# Patient Record
Sex: FEMALE | Race: AMERICAN INDIAN OR ALASKA NATIVE | HISPANIC OR LATINO | Employment: FULL TIME | URBAN - METROPOLITAN AREA
[De-identification: names, ages, dates, MRNs, and addresses within clinical notes are randomized per-mention and may not be internally consistent; named-entity substitution may affect disease eponyms.]

---

## 2017-07-07 ENCOUNTER — HOSPITAL ENCOUNTER (EMERGENCY)
Facility: MEDICAL CENTER | Age: 32
End: 2017-07-07
Attending: EMERGENCY MEDICINE
Payer: COMMERCIAL

## 2017-07-07 ENCOUNTER — OFFICE VISIT (OUTPATIENT)
Dept: URGENT CARE | Facility: PHYSICIAN GROUP | Age: 32
End: 2017-07-07
Payer: COMMERCIAL

## 2017-07-07 ENCOUNTER — APPOINTMENT (OUTPATIENT)
Dept: RADIOLOGY | Facility: MEDICAL CENTER | Age: 32
End: 2017-07-07
Attending: EMERGENCY MEDICINE
Payer: COMMERCIAL

## 2017-07-07 VITALS
WEIGHT: 265 LBS | HEART RATE: 93 BPM | DIASTOLIC BLOOD PRESSURE: 68 MMHG | OXYGEN SATURATION: 97 % | BODY MASS INDEX: 44.1 KG/M2 | TEMPERATURE: 97.7 F | SYSTOLIC BLOOD PRESSURE: 138 MMHG | RESPIRATION RATE: 16 BRPM

## 2017-07-07 VITALS
WEIGHT: 265 LBS | OXYGEN SATURATION: 97 % | RESPIRATION RATE: 16 BRPM | DIASTOLIC BLOOD PRESSURE: 70 MMHG | BODY MASS INDEX: 44.15 KG/M2 | HEART RATE: 82 BPM | HEIGHT: 65 IN | SYSTOLIC BLOOD PRESSURE: 110 MMHG | TEMPERATURE: 99 F

## 2017-07-07 DIAGNOSIS — O20.0 ABORTION, THREATENED: ICD-10-CM

## 2017-07-07 DIAGNOSIS — O26.899 ABDOMINAL CRAMPING COMPLICATING PREGNANCY: ICD-10-CM

## 2017-07-07 DIAGNOSIS — F41.8 SITUATIONAL ANXIETY: ICD-10-CM

## 2017-07-07 DIAGNOSIS — R10.9 ABDOMINAL CRAMPING COMPLICATING PREGNANCY: ICD-10-CM

## 2017-07-07 DIAGNOSIS — Z34.90 PREGNANCY WITH UNCERTAIN DATES, UNSPECIFIED TRIMESTER: ICD-10-CM

## 2017-07-07 DIAGNOSIS — R82.71 ASYMPTOMATIC BACTERIURIA: ICD-10-CM

## 2017-07-07 DIAGNOSIS — R10.30 LOWER ABDOMINAL PAIN: ICD-10-CM

## 2017-07-07 LAB
ALBUMIN SERPL BCP-MCNC: 3.6 G/DL (ref 3.2–4.9)
ALBUMIN/GLOB SERPL: 1 G/DL
ALP SERPL-CCNC: 50 U/L (ref 30–99)
ALT SERPL-CCNC: 11 U/L (ref 2–50)
ANION GAP SERPL CALC-SCNC: 10 MMOL/L (ref 0–11.9)
APPEARANCE UR: CLEAR
AST SERPL-CCNC: 13 U/L (ref 12–45)
B-HCG SERPL-ACNC: ABNORMAL MIU/ML (ref 0–5)
BACTERIA #/AREA URNS HPF: ABNORMAL /HPF
BASOPHILS # BLD AUTO: 0.3 % (ref 0–1.8)
BASOPHILS # BLD: 0.03 K/UL (ref 0–0.12)
BILIRUB SERPL-MCNC: 0.2 MG/DL (ref 0.1–1.5)
BILIRUB UR QL STRIP.AUTO: NEGATIVE
BUN SERPL-MCNC: 8 MG/DL (ref 8–22)
CALCIUM SERPL-MCNC: 9 MG/DL (ref 8.5–10.5)
CAOX CRY #/AREA URNS HPF: ABNORMAL /HPF
CHLORIDE SERPL-SCNC: 102 MMOL/L (ref 96–112)
CO2 SERPL-SCNC: 21 MMOL/L (ref 20–33)
COLOR UR: YELLOW
CREAT SERPL-MCNC: 0.66 MG/DL (ref 0.5–1.4)
CULTURE IF INDICATED INDCX: YES UA CULTURE
EOSINOPHIL # BLD AUTO: 0.16 K/UL (ref 0–0.51)
EOSINOPHIL NFR BLD: 1.4 % (ref 0–6.9)
EPI CELLS #/AREA URNS HPF: ABNORMAL /HPF
ERYTHROCYTE [DISTWIDTH] IN BLOOD BY AUTOMATED COUNT: 41 FL (ref 35.9–50)
GFR SERPL CREATININE-BSD FRML MDRD: >60 ML/MIN/1.73 M 2
GLOBULIN SER CALC-MCNC: 3.7 G/DL (ref 1.9–3.5)
GLUCOSE SERPL-MCNC: 85 MG/DL (ref 65–99)
GLUCOSE UR STRIP.AUTO-MCNC: NEGATIVE MG/DL
HCT VFR BLD AUTO: 41.8 % (ref 37–47)
HGB BLD-MCNC: 14.1 G/DL (ref 12–16)
HYALINE CASTS #/AREA URNS LPF: ABNORMAL /LPF
IMM GRANULOCYTES # BLD AUTO: 0.04 K/UL (ref 0–0.11)
IMM GRANULOCYTES NFR BLD AUTO: 0.3 % (ref 0–0.9)
KETONES UR STRIP.AUTO-MCNC: NEGATIVE MG/DL
LEUKOCYTE ESTERASE UR QL STRIP.AUTO: NEGATIVE
LYMPHOCYTES # BLD AUTO: 2.58 K/UL (ref 1–4.8)
LYMPHOCYTES NFR BLD: 22.4 % (ref 22–41)
MCH RBC QN AUTO: 28.6 PG (ref 27–33)
MCHC RBC AUTO-ENTMCNC: 33.7 G/DL (ref 33.6–35)
MCV RBC AUTO: 84.8 FL (ref 81.4–97.8)
MICRO URNS: ABNORMAL
MONOCYTES # BLD AUTO: 0.67 K/UL (ref 0–0.85)
MONOCYTES NFR BLD AUTO: 5.8 % (ref 0–13.4)
NEUTROPHILS # BLD AUTO: 8.05 K/UL (ref 2–7.15)
NEUTROPHILS NFR BLD: 69.8 % (ref 44–72)
NITRITE UR QL STRIP.AUTO: NEGATIVE
NRBC # BLD AUTO: 0 K/UL
NRBC BLD AUTO-RTO: 0 /100 WBC
NUMBER OF RH DOSES IND 8505RD: NORMAL
PH UR STRIP.AUTO: 6 [PH]
PLATELET # BLD AUTO: 292 K/UL (ref 164–446)
PMV BLD AUTO: 9.8 FL (ref 9–12.9)
POTASSIUM SERPL-SCNC: 3.7 MMOL/L (ref 3.6–5.5)
PROT SERPL-MCNC: 7.3 G/DL (ref 6–8.2)
PROT UR QL STRIP: NEGATIVE MG/DL
RBC # BLD AUTO: 4.93 M/UL (ref 4.2–5.4)
RBC # URNS HPF: ABNORMAL /HPF
RBC UR QL AUTO: ABNORMAL
RH BLD: NORMAL
SODIUM SERPL-SCNC: 133 MMOL/L (ref 135–145)
SP GR UR STRIP.AUTO: 1.03
WBC # BLD AUTO: 11.5 K/UL (ref 4.8–10.8)
WBC #/AREA URNS HPF: ABNORMAL /HPF

## 2017-07-07 PROCEDURE — 85025 COMPLETE CBC W/AUTO DIFF WBC: CPT

## 2017-07-07 PROCEDURE — A9270 NON-COVERED ITEM OR SERVICE: HCPCS | Performed by: EMERGENCY MEDICINE

## 2017-07-07 PROCEDURE — 700102 HCHG RX REV CODE 250 W/ 637 OVERRIDE(OP): Performed by: EMERGENCY MEDICINE

## 2017-07-07 PROCEDURE — 99284 EMERGENCY DEPT VISIT MOD MDM: CPT

## 2017-07-07 PROCEDURE — 86901 BLOOD TYPING SEROLOGIC RH(D): CPT

## 2017-07-07 PROCEDURE — 80053 COMPREHEN METABOLIC PANEL: CPT

## 2017-07-07 PROCEDURE — 76817 TRANSVAGINAL US OBSTETRIC: CPT

## 2017-07-07 PROCEDURE — 81001 URINALYSIS AUTO W/SCOPE: CPT

## 2017-07-07 PROCEDURE — 84702 CHORIONIC GONADOTROPIN TEST: CPT

## 2017-07-07 PROCEDURE — 99202 OFFICE O/P NEW SF 15 MIN: CPT | Performed by: NURSE PRACTITIONER

## 2017-07-07 PROCEDURE — 87086 URINE CULTURE/COLONY COUNT: CPT

## 2017-07-07 RX ORDER — CEFDINIR 300 MG/1
300 CAPSULE ORAL 2 TIMES DAILY
Qty: 14 CAP | Refills: 0 | Status: SHIPPED | OUTPATIENT
Start: 2017-07-07 | End: 2017-07-14

## 2017-07-07 RX ORDER — CEFDINIR 300 MG/1
300 CAPSULE ORAL ONCE
Status: COMPLETED | OUTPATIENT
Start: 2017-07-07 | End: 2017-07-07

## 2017-07-07 RX ADMIN — CEFDINIR 300 MG: 300 CAPSULE ORAL at 21:23

## 2017-07-07 ASSESSMENT — ENCOUNTER SYMPTOMS
CHILLS: 0
CONSTIPATION: 0
ABDOMINAL PAIN: 1
HEADACHES: 0
VOMITING: 0
NAUSEA: 1
FEVER: 0
DIZZINESS: 0

## 2017-07-07 ASSESSMENT — LIFESTYLE VARIABLES: SUBSTANCE_ABUSE: 0

## 2017-07-07 ASSESSMENT — PAIN SCALES - GENERAL: PAINLEVEL: 5=MODERATE PAIN

## 2017-07-07 NOTE — MR AVS SNAPSHOT
"        Chey Guo   2017 3:40 PM   Office Visit   MRN: [id number]    Department:  [facility]   Dept Phone:  [phone]    Description:  Female : 1985   Provider:  SELMA Kurtz           Reason for Visit     LLQ Pain Lower L. cramping X 2 weeks Pt. is pregnant but is unsure how far along.       Allergies as of 2017     No Known Allergies      You were diagnosed with     Lower abdominal pain   [588903]       Pregnancy with uncertain dates, unspecified trimester   [5455926]         Vital Signs     Blood Pressure Pulse Temperature Respirations Height Weight    110/70 mmHg 82 37.2 °C (99 °F) 16 1.651 m (5' 5\") 120.203 kg (265 lb)    Body Mass Index Oxygen Saturation                44.10 kg/m2 97%          Basic Information     Date Of Birth Sex Race Ethnicity Preferred Language    1985 Female Unknown Unknown English      Your appointments     2017 10:30 AM   New OB Exam 1 with SONNY WAKEFIELD   [facility] [address] [address]   [phone]              Health Maintenance     Patient has no pending health maintenance at this time      Current Immunizations     No immunizations on file.      Below and/or attached are the medications your provider expects you to take. Review all of your home medications and newly ordered medications with your provider and/or pharmacist. Follow medication instructions as directed by your provider and/or pharmacist. Please keep your medication list with you and share with your provider. Update the information when medications are discontinued, doses are changed, or new medications (including over-the-counter products) are added; and carry medication information at all times in the event of emergency situations     Allergies:  No Known Allergies          Medications  Valid as of: 2017 -  4:39 PM    Generic Name Brand Name Tablet Size Instructions for use    Prenatal Multivit-Min-Fe-FA   " Take  by mouth.        .                 Medicines prescribed today were sent to:     [facility] [address] [place] [address], [place] [address] [facility] [place]    [address] [place] [address] [address] [place] [address]    Phone: [phone] Fax: [phone]    Open 24 Hours?: No      Medication refill instructions:       If your prescription bottle indicates you have medication refills left, it is not necessary to call your provider’s office. Please contact your pharmacy and they will refill your medication.    If your prescription bottle indicates you do not have any refills left, you may request refills at any time through one of the following ways: The online Hello Health system (except Urgent Care), by calling your provider’s office, or by asking your pharmacy to contact your provider’s office with a refill request. Medication refills are processed only during regular business hours and may not be available until the next business day. Your provider may request additional information or to have a follow-up visit with you prior to refilling your medication.   *Please Note: Medication refills are assigned a new Rx number when refilled electronically. Your pharmacy may indicate that no refills were authorized even though a new prescription for the same medication is available at the pharmacy. Please request the medicine by name with the pharmacy before contacting your provider for a refill.           Hello Health Access Code: [email or web address]  Expires: 8/6/2017  4:39 PM    Your email address is not on file at [facility].  Email Addresses are required for you to sign up for Hello Health, please contact [phone] to verify your personal information and to provide your email address prior to attempting to register for Hello Health.    Chey Guo  [address] [place] [address]    Hello Health  A secure, online tool to manage your health information     [facility]’s Hello Health® is a secure, online tool that connects you to your personalized  health information from the privacy of your home -- day or night - making it very easy for you to manage your healthcare. Once the activation process is completed, you can even access your medical information using the [employer] annmarie, which is available for free in the Apple Annmarie store or Google Play store.     To learn more about [employer] visit [email or web address]    There are two levels of access available (as shown below):   My Chart Features  [facility]  Specialists [facility]  Urgent  Care Non-[facility] Primary Care Doctor   Email your healthcare team securely and privately 24/7 X X X    Manage appointments: schedule your next appointment; view details of past/upcoming appointments X      Request prescription refills. X      View recent personal medical records, including lab and immunizations X X X X   View health record, including health history, allergies, medications X X X X   Read reports about your outpatient visits, procedures, consult and ER notes X X X X   See your discharge summary, which is a recap of your hospital and/or ER visit that includes your diagnosis, lab results, and care plan X X  X     How to register for [employer]  Once your e-mail address has been verified, follow the following steps to sign up for [employer]     1. Go to  [email or web address]  2. Click on the Sign Up Now box, which takes you to the New Member Sign Up page. You will need to provide the following information:  a. Enter your [employer] Access Code exactly as it appears at the top of this page. (You will not need to use this code after you’ve completed the sign-up process. If you do not sign up before the expiration date, you must request a new code.)   b. Enter your date of birth.   c. Enter your home email address.   d. Click Submit, and follow the next screen’s instructions.  3. Create a [employer] ID. This will be your [employer] login ID and cannot be changed, so think of one that is secure and easy to  remember.  4. Create a [employer] password. You can change your password at any time.  5. Enter your Password Reset Question and Answer. This can be used at a later time if you forget your password.   6. Enter your e-mail address. This allows you to receive e-mail notifications when new information is available in [employer].  7. Click Sign Up. You can now view your health information.    For assistance activating your [employer] account, call [phone]

## 2017-07-07 NOTE — ED AVS SNAPSHOT
Equals6 Access Code: [email or web address]  Expires: 8/6/2017  4:39 PM    Your email address is not on file at [facility].  Email Addresses are required for you to sign up for Equals6, please contact [phone] to verify your personal information and to provide your email address prior to attempting to register for Equals6.    Chey Guo  [address] [place] [address]    Equals6  A secure, online tool to manage your health information     [facility]’s Equals6® is a secure, online tool that connects you to your personalized health information from the privacy of your home -- day or night - making it very easy for you to manage your healthcare. Once the activation process is completed, you can even access your medical information using the [employer] annmarie, which is available for free in the Apple Annmarie store or Google Play store.     To learn more about Equals6, visit [email or web address]    There are two levels of access available (as shown below):   My Chart Features  [facility] Primary Care Doctor [facility]  Specialists [facility]  Urgent  Care Non-[facility] Primary Care Doctor   Email your healthcare team securely and privately 24/7 X X X    Manage appointments: schedule your next appointment; view details of past/upcoming appointments X      Request prescription refills. X      View recent personal medical records, including lab and immunizations X X X X   View health record, including health history, allergies, medications X X X X   Read reports about your outpatient visits, procedures, consult and ER notes X X X X   See your discharge summary, which is a recap of your hospital and/or ER visit that includes your diagnosis, lab results, and care plan X X  X     How to register for [employer]:  Once your e-mail address has been verified, follow the following steps to sign up for Equals6.     1. Go to  [email or web address]  2. Click on the Sign Up Now box, which takes you to the New Member Sign Up page. You will need to  provide the following information:  a. Enter your [employer] Access Code exactly as it appears at the top of this page. (You will not need to use this code after you’ve completed the sign-up process. If you do not sign up before the expiration date, you must request a new code.)   b. Enter your date of birth.   c. Enter your home email address.   d. Click Submit, and follow the next screen’s instructions.  3. Create a [employer] ID. This will be your [employer] login ID and cannot be changed, so think of one that is secure and easy to remember.  4. Create a [employer] password. You can change your password at any time.  5. Enter your Password Reset Question and Answer. This can be used at a later time if you forget your password.   6. Enter your e-mail address. This allows you to receive e-mail notifications when new information is available in Attorney[employer]ee.  7. Click Sign Up. You can now view your health information.    For assistance activating your [employer] account, call [phone]

## 2017-07-07 NOTE — ED AVS SNAPSHOT
Home Care Instructions                                                                                                                Chey Guo   MRN: [id number]    Department:  [facility], Emergency Dept   Date of Visit:  7/7/2017            [facility], Emergency Dept    [address] [address]    Phone:  [phone]      You were seen by     Rodger Camp M.D.      Your Diagnosis Was     Abdominal cramping complicating pregnancy     O26.899, R10.9       These are the medications you received during your hospitalization from 07/07/2017 1654 to 07/07/2017 2208     Date/Time Order Dose Route Action    07/07/2017 2123 cefdinir (OMNICEF) capsule 300 mg 300 mg Oral Given      Follow-up Information     1. Follow up with Pregnancy [facility] Rod. Schedule an appointment as soon as possible for a visit in 3 days.    Specialty:  OB/Gyn    Contact information    [address] [address]  [phone]        Medication Information     Review all of your home medications and newly ordered medications with your primary doctor and/or pharmacist as soon as possible. Follow medication instructions as directed by your doctor and/or pharmacist.     Please keep your complete medication list with you and share with your physician. Update the information when medications are discontinued, doses are changed, or new medications (including over-the-counter products) are added; and carry medication information at all times in the event of emergency situations.               Medication List      START taking these medications        Instructions    Morning Afternoon Evening Bedtime    cefdinir 300 MG Caps   Last time this was given:  300 mg on 7/7/2017  9:23 PM   Commonly known as:  OMNICEF        Take 1 Cap by mouth 2 times a day for 7 days.   Dose:  300 mg                          ASK your doctor about these medications        Instructions    Morning Afternoon Evening  Bedtime    PRENATAL VITAMINS PO        Take  by mouth.                             Where to Get Your Medications      You can get these medications from any pharmacy     Bring a paper prescription for each of these medications    - cefdinir 300 MG Caps            Procedures and tests performed during your visit     CBC WITH DIFFERENTIAL    COMP METABOLIC PANEL    ESTIMATED GFR    HCG QUANTITATIVE SERUM    IV SALINE LOCK    RH TYPE FOR RHOGAM FROM E.D.    Repeat Vitals Signs    URINALYSIS CULTURE, IF INDICATED    URINE CULTURE(NEW)    URINE MICROSCOPIC (W/UA)    US-OB PELVIS TRANSVAGINAL        Discharge Instructions       U had some bacteria in your urine, therefore you're being prescribed antibiotics.  Return to the ER here more abdominal pain, if you develop vaginal bleeding, fever, or other concerns.  Follow up with the pregnancy Center.      Abdominal Pain During Pregnancy  Belly (abdominal) pain is common during pregnancy. Most of the time, it is not a serious problem. Other times, it can be a sign that something is wrong with the pregnancy. Always tell your doctor if you have belly pain.  HOME CARE  Monitor your belly pain for any changes. The following actions may help you feel better:  · Do not have sex (intercourse) or put anything in your vagina until you feel better.  · Rest until your pain stops.  · Drink clear fluids if you feel sick to your stomach (nauseous). Do not eat solid food until you feel better.  · Only take medicine as told by your doctor.  · Keep all doctor visits as told.  GET HELP RIGHT AWAY IF:   · You are bleeding, leaking fluid, or pieces of tissue come out of your vagina.  · You have more pain or cramping.  · You keep throwing up (vomiting).  · You have pain when you pee (urinate) or have blood in your pee.  · You have a fever.  · You do not feel your baby moving as much.  · You feel very weak or feel like passing out.  · You have trouble breathing, with or without belly pain.  · You  have a very bad headache and belly pain.  · You have fluid leaking from your vagina and belly pain.  · You keep having watery poop (diarrhea).  · Your belly pain does not go away after resting, or the pain gets worse.  MAKE SURE YOU:   · Understand these instructions.  · Will watch your condition.  · Will get help right away if you are not doing well or get worse.     This information is not intended to replace advice given to you by your health care provider. Make sure you discuss any questions you have with your health care provider.     Document Released: 12/06/2010 Document Revised: 08/20/2014 Document Reviewed: 07/17/2014  [employer] Interactive Patient Education ©2016 Elsevier Inc.        Threatened Miscarriage  A threatened miscarriage occurs when you have vaginal bleeding during your first 20 weeks of pregnancy but the pregnancy has not ended. If you have vaginal bleeding during this time, your health care provider will do tests to make sure you are still pregnant. If the tests show you are still pregnant and the developing baby (fetus) inside your womb (uterus) is still growing, your condition is considered a threatened miscarriage.  A threatened miscarriage does not mean your pregnancy will end, but it does increase the risk of losing your pregnancy (complete miscarriage).  CAUSES   The cause of a threatened miscarriage is usually not known. If you go on to have a complete miscarriage, the most common cause is an abnormal number of chromosomes in the developing baby. Chromosomes are the structures inside cells that hold all your genetic material.  Some causes of vaginal bleeding that do not result in miscarriage include:  · Having sex.  · Having an infection.  · Normal hormone changes of pregnancy.  · Bleeding that occurs when an egg implants in your uterus.  RISK FACTORS  Risk factors for bleeding in early pregnancy include:  · Obesity.  · Smoking.  · Drinking excessive amounts of alcohol or  caffeine.  · Recreational drug use.  SIGNS AND SYMPTOMS  · Light vaginal bleeding.  · Mild abdominal pain or cramps.  DIAGNOSIS   If you have bleeding with or without abdominal pain before 20 weeks of pregnancy, your health care provider will do tests to check whether you are still pregnant. One important test involves using sound waves and a computer (ultrasound) to create images of the inside of your uterus. Other tests include an internal exam of your vagina and uterus (pelvic exam) and measurement of your baby's heart rate.   You may be diagnosed with a threatened miscarriage if:  · Ultrasound testing shows you are still pregnant.  · Your baby's heart rate is strong.  · A pelvic exam shows that the opening between your uterus and your vagina (cervix) is closed.  · Your heart rate and blood pressure are stable.  · Blood tests confirm you are still pregnant.  TREATMENT   No treatments have been shown to prevent a threatened miscarriage from going on to a complete miscarriage. However, the right home care is important.   HOME CARE INSTRUCTIONS   · Make sure you keep all your appointments for prenatal care. This is very important.  · Get plenty of rest.  · Do not have sex or use tampons if you have vaginal bleeding.  · Do not douche.  · Do not smoke or use recreational drugs.  · Do not drink alcohol.  · Avoid caffeine.  SEEK MEDICAL CARE IF:  · You have light vaginal bleeding or spotting while pregnant.  · You have abdominal pain or cramping.  · You have a fever.  SEEK IMMEDIATE MEDICAL CARE IF:  · You have heavy vaginal bleeding.  · You have blood clots coming from your vagina.  · You have severe low back pain or abdominal cramps.  · You have fever, chills, and severe abdominal pain.  MAKE SURE YOU:  · Understand these instructions.  · Will watch your condition.  · Will get help right away if you are not doing well or get worse.     This information is not intended to replace advice given to you by your health care  provider. Make sure you discuss any questions you have with your health care provider.     Document Released: 12/18/2006 Document Revised: 12/23/2014 Document Reviewed: 10/14/2014  [employer] Interactive Patient Education ©2016 [employer].      Urinary Tract Infection  Urinary tract infections (UTIs) can develop anywhere along your urinary tract. Your urinary tract is your body's drainage system for removing wastes and extra water. Your urinary tract includes two kidneys, two ureters, a bladder, and a urethra. Your kidneys are a pair of bean-shaped organs. Each kidney is about the size of your fist. They are located below your ribs, one on each side of your spine.  CAUSES  Infections are caused by microbes, which are microscopic organisms, including fungi, viruses, and bacteria. These organisms are so small that they can only be seen through a microscope. Bacteria are the microbes that most commonly cause UTIs.  SYMPTOMS   Symptoms of UTIs may vary by age and gender of the patient and by the location of the infection. Symptoms in young women typically include a frequent and intense urge to urinate and a painful, burning feeling in the bladder or urethra during urination. Older women and men are more likely to be tired, shaky, and weak and have muscle aches and abdominal pain. A fever may mean the infection is in your kidneys. Other symptoms of a kidney infection include pain in your back or sides below the ribs, nausea, and vomiting.  DIAGNOSIS  To diagnose a UTI, your caregiver will ask you about your symptoms. Your caregiver also will ask to provide a urine sample. The urine sample will be tested for bacteria and white blood cells. White blood cells are made by your body to help fight infection.  TREATMENT   Typically, UTIs can be treated with medication. Because most UTIs are caused by a bacterial infection, they usually can be treated with the use of antibiotics. The choice of antibiotic and length of treatment  depend on your symptoms and the type of bacteria causing your infection.  HOME CARE INSTRUCTIONS  · If you were prescribed antibiotics, take them exactly as your caregiver instructs you. Finish the medication even if you feel better after you have only taken some of the medication.  · Drink enough water and fluids to keep your urine clear or pale yellow.  · Avoid caffeine, tea, and carbonated beverages. They tend to irritate your bladder.  · Empty your bladder often. Avoid holding urine for long periods of time.  · Empty your bladder before and after sexual intercourse.  · After a bowel movement, women should cleanse from front to back. Use each tissue only once.  SEEK MEDICAL CARE IF:   · You have back pain.  · You develop a fever.  · Your symptoms do not begin to resolve within 3 days.  SEEK IMMEDIATE MEDICAL CARE IF:   · You have severe back pain or lower abdominal pain.  · You develop chills.  · You have nausea or vomiting.  · You have continued burning or discomfort with urination.  MAKE SURE YOU:   · Understand these instructions.  · Will watch your condition.  · Will get help right away if you are not doing well or get worse.     This information is not intended to replace advice given to you by your health care provider. Make sure you discuss any questions you have with your health care provider.     Document Released: 09/27/2006 Document Revised: 01/08/2016 Document Reviewed: 01/25/2013  Elsevier Interactive Patient Education ©2016 [employer]            Patient Information     Patient Information    Following emergency treatment: all patient requiring follow-up care must return either to a private physician or a clinic if your condition worsens before you are able to obtain further medical attention, please return to the emergency room.     Billing Information    At [facility], we work to make the billing process streamlined for our patients.  Our Representatives are here to answer any questions you  may have regarding your hospital bill.  If you have insurance coverage and have supplied your insurance information to us, we will submit a claim to your insurer on your behalf.  Should you have any questions regarding your bill, we can be reached online or by phone as follows:  Online: You are able pay your bills online or live chat with our representatives about any billing questions you may have. We are here to help Monday - Friday from 8:00am to 7:30pm and 9:00am - 12:00pm on Saturdays.  Please visit [email or web address]  for more information.   Phone:  [phone] or [phone]    Please note that your emergency physician, surgeon, pathologist, radiologist, anesthesiologist, and other specialists are not employed by [facility] and will therefore bill separately for their services.  Please contact them directly for any questions concerning their bills at the numbers below:     Emergency Physician Services:  [phone]  [facility]:  [phone]  [facility]:  [phone]  [facility]:  [phone]    1. Your final bill may vary from the amount quoted upon discharge if all procedures are not complete at that time, or if your doctor has additional procedures of which we are not aware. You will receive an additional bill if you return to the Emergency Department at [facility] for suture removal regardless of the facility of which the sutures were placed.     2. Please arrange for settlement of this account at the emergency registration.    3. All self-pay accounts are due in full at the time of treatment.  If you are unable to meet this obligation then payment is expected within 4-5 days.     4. If you have had radiology studies (CT, X-ray, Ultrasound, MRI), you have received a preliminary result during your emergency department visit. Please contact the radiology department [phone] to receive a copy of your final  result. Please discuss the Final result with your primary physician or with the follow up physician provided.     Crisis Hotline:  [place] Crisis Hotline:  6-724-LGTKYOU or 1-774.156.3038  [place] Crisis Hotline:    1-483.895.8374 or 574-045-2216         ED Discharge Follow Up Questions    1. In order to provide you with very good care, we would like to follow up with a phone call in the next few days.  May we have your permission to contact you?     YES /  NO    2. What is the best phone number to call you? (       )_____-__________    3. What is the best time to call you?      Morning  /  Afternoon  /  Evening                   Patient Signature:  ____________________________________________________________    Date:  ____________________________________________________________      Your appointments     Jul 14, 2017 10:30 AM   New OB Exam 1 with SONNY WAKEFIELD   [facility])    [address] [address]  [place] [address]   [phone]

## 2017-07-07 NOTE — ED AVS SNAPSHOT
7/7/2017    Chey Guo  [address] [address]    Dear Chey:    [facility] wants to ensure your discharge home is safe and you or your loved ones have had all of your questions answered regarding your care after you leave the hospital.    Below is a list of resources and contact information should you have any questions regarding your hospital stay, follow-up instructions, or active medical symptoms.    Questions or Concerns Regarding… Contact   Medical Questions Related to Your Discharge  (7 days a week, 8am-5pm) Contact a Nurse Care Coordinator   [phone]   Medical Questions Not Related to Your Discharge  (24 hours a day / 7 days a week)  Contact the Nurse Health Line   [phone]    Medications or Discharge Instructions Refer to your discharge packet   or contact your [facility] Primary Care Provider   [phone]   Follow-up Appointment(s) Schedule your appointment via [employer]   or contact Scheduling [phone]   Billing Review your statement via [employer]  or contact Billing [phone]   Medical Records Review your records via [employer]   or contact Medical Records [phone]     You may receive a telephone call within two days of discharge. This call is to make certain you understand your discharge instructions and have the opportunity to have any questions answered. You can also easily access your medical information, test results and upcoming appointments via the [employer] free online health management tool. You can learn more and sign up at [employer]. For assistance setting up your [employer] account, please call [phone].    Once again, we want to ensure your discharge home is safe and that you have a clear understanding of any next steps in your care. If you have any questions or concerns, please do not hesitate to contact us, we are here for you. Thank you for choosing [facility] for your healthcare needs.    Sincerely,    Your [facility] Healthcare Team

## 2017-07-07 NOTE — PROGRESS NOTES
"Subjective:      Chey Guo is a 32 y.o. female who presents with LLQ Pain    PFSH reviewed and updated as necessary in EPIC electronic record with patient today.  Medications including OTC medications reviewed with patient.         No Known Allergies          HPI abdominal cramping started a few weeks ago, increasing over the past 3 days.  + pregnancy. G2, P1. LMP: Unknown ( irregular menses) Hx of having now menses for > 6 months. . Has appt with [facility] pregnancy center for US scheduled next week. Has waited over a month for her US appt.   Similar pain to menstrual cramping in lower abdomen and in lower abdomen.   No vaginal bleeding. No dysuria, burning. She is having urinary frequency. She was trying to get pregnant for the past 5 years with [facility].     Review of Systems   Constitutional: Negative for fever and chills.   Cardiovascular: Negative for leg swelling.   Gastrointestinal: Negative for constipation.   Genitourinary: Negative for hematuria.   Neurological: Negative for dizziness and headaches.   Psychiatric/Behavioral: Negative for substance abuse.          Objective:     /70 mmHg  Pulse 82  Temp(Src) 37.2 °C (99 °F)  Resp 16  Ht 1.651 m (5' 5\")  Wt 120.203 kg (265 lb)  BMI 44.10 kg/m2  SpO2 97%     Physical Exam   Constitutional: She is oriented to person, place, and time. She appears well-developed and well-nourished.   HENT:   Head: Normocephalic.   Cardiovascular: Normal rate.    Pulmonary/Chest: Effort normal.   Abdominal: Soft. Normal appearance and bowel sounds are normal. She exhibits no distension. There is generalized tenderness and tenderness in the right lower quadrant and left lower quadrant. There is no rigidity, no rebound, no guarding and no CVA tenderness.   Unable to feel fundus height secondary to body habitus     Neurological: She is alert and oriented to person, place, and time.   Nursing note and vitals reviewed.       Pt voided in waiting room and unable to give " Urine specimen when roomed. Given water to drink. Sample not obtained since it was decided to send her to ER .        Assessment/Plan:     1. Lower abdominal pain     2. Pregnancy with uncertain dates, unspecified trimester       [facility] ER  called to arrange transfer to higher level of care. Report given to Dr Guo.  Pt is to be transported via private car.   I have reiterated to patient that although a provider to provider transfer was made this will not necessarily expedite the ER process.

## 2017-07-08 NOTE — ED NOTES
"Pt states that she took a urine pregnancy test at home \"a couple weeks ago and it was positive so I went to the [facility] and they did a blood test and when I called back to get the results they told me I had to wait until I went to the pregnancy center but that's not until next week\".  "

## 2017-07-08 NOTE — ED NOTES
Pt talking on cell phone during triage. Pt c/o abd cramping, pt states pregnant and unsure how far. Denies vaginal bleeding. Pt has appt at pregnancy center next week.

## 2017-07-08 NOTE — ED PROVIDER NOTES
ED Provider Note    Scribed for Rodger Camp M.D. by Samuel Gonzalez. 2017, 6:01 PM.    Primary care provider: Pcp Pt States None  Means of arrival: Walk in  History obtained from: Patient  History limited by: None    CHIEF COMPLAINT  Chief Complaint   Patient presents with   • Abdominal Cramping       HPI  Chey Guo is a 32 y.o. female who presents to the Emergency Department complaining of intermittent abdominal cramping onset a couple of weeks ago. Patient localizes the pain primarily to her bilateral lower quadrants of her abdomen. The cramps come in waves. Patient states the cramping feels similar to menstrual cramps. She rates the pain a 2-3/10 in severity at this time. Patient notes associated nausea. She denies any vaginal discharge, dysuria, or vomiting. She is unsure when her last menstrual period was. She notes currently being pregnant which she verified with a home pregnancy kit and after evaluation in her doctor's office. Patient has been pregnant before and currently has one child. Patient denies chance of having STD. She notes taking medication for her thyroid. Patient is a former smoker. She stopped smoking after finding out she was pregnant. She denies alcohol consumption. Patient had a  in the past. She otherwise denies any other surgeries.  she G2, P1, unknown LMP    REVIEW OF SYSTEMS  Review of Systems   Gastrointestinal: Positive for nausea and abdominal pain (cramping). Negative for vomiting.   Genitourinary: Negative for dysuria.        Negative vaginal discharge   All other systems reviewed and are negative.  C    PAST MEDICAL HISTORY   None noted    SURGICAL HISTORY  patient denies any surgical history    SOCIAL HISTORY  None noted    FAMILY HISTORY  None noted    CURRENT MEDICATIONS  Current medications can be seen on the nurse's note.     ALLERGIES  No Known Allergies    PHYSICAL EXAM  VITAL SIGNS: /71 mmHg  Pulse 96  Temp(Src) 36.5 °C (97.7 °F) (Temporal)   Resp 18  Wt 120.203 kg (265 lb)  SpO2 97%  Vitals reviewed.  Constitutional: Well developed, Well nourished, No acute distress, Non-toxic appearance.   HENT: Normocephalic, Atraumatic, Bilateral external ears normal, Oropharynx moist, No oral exudates, Nose normal.   Eyes: PERRL, EOMI, Conjunctiva normal, No discharge.   Neck: Normal range of motion, No tenderness, Supple, No stridor.   Cardiovascular: Normal heart rate, Normal rhythm, No murmurs, No rubs, No gallops.   Thorax & Lungs: Normal breath sounds, No respiratory distress, No wheezing, No chest tenderness.   Abdomen: Bowel sounds normal, Soft. Left lower quadrant tenderness.  No right lower quadrant tenderness.  No peritonitis.  Skin: Warm, Dry, No erythema, No rash.   Back: No tenderness, No CVA tenderness.   Musculoskeletal: Good range of motion in all major joints.   Neurologic: Alert, No focal deficits noted.   Psychiatric: Affect anxious    LABS  Results for orders placed or performed during the hospital encounter of 07/07/17   CBC WITH DIFFERENTIAL   Result Value Ref Range    WBC 11.5 (H) 4.8 - 10.8 K/uL    RBC 4.93 4.20 - 5.40 M/uL    Hemoglobin 14.1 12.0 - 16.0 g/dL    Hematocrit 41.8 37.0 - 47.0 %    MCV 84.8 81.4 - 97.8 fL    MCH 28.6 27.0 - 33.0 pg    MCHC 33.7 33.6 - 35.0 g/dL    RDW 41.0 35.9 - 50.0 fL    Platelet Count 292 164 - 446 K/uL    MPV 9.8 9.0 - 12.9 fL    Neutrophils-Polys 69.80 44.00 - 72.00 %    Lymphocytes 22.40 22.00 - 41.00 %    Monocytes 5.80 0.00 - 13.40 %    Eosinophils 1.40 0.00 - 6.90 %    Basophils 0.30 0.00 - 1.80 %    Immature Granulocytes 0.30 0.00 - 0.90 %    Nucleated RBC 0.00 /100 WBC    Neutrophils (Absolute) 8.05 (H) 2.00 - 7.15 K/uL    Lymphs (Absolute) 2.58 1.00 - 4.80 K/uL    Monos (Absolute) 0.67 0.00 - 0.85 K/uL    Eos (Absolute) 0.16 0.00 - 0.51 K/uL    Baso (Absolute) 0.03 0.00 - 0.12 K/uL    Immature Granulocytes (abs) 0.04 0.00 - 0.11 K/uL    NRBC (Absolute) 0.00 K/uL   COMP METABOLIC PANEL   Result  Value Ref Range    Sodium 133 (L) 135 - 145 mmol/L    Potassium 3.7 3.6 - 5.5 mmol/L    Chloride 102 96 - 112 mmol/L    Co2 21 20 - 33 mmol/L    Anion Gap 10.0 0.0 - 11.9    Glucose 85 65 - 99 mg/dL    Bun 8 8 - 22 mg/dL    Creatinine 0.66 0.50 - 1.40 mg/dL    Calcium 9.0 8.5 - 10.5 mg/dL    AST(SGOT) 13 12 - 45 U/L    ALT(SGPT) 11 2 - 50 U/L    Alkaline Phosphatase 50 30 - 99 U/L    Total Bilirubin 0.2 0.1 - 1.5 mg/dL    Albumin 3.6 3.2 - 4.9 g/dL    Total Protein 7.3 6.0 - 8.2 g/dL    Globulin 3.7 (H) 1.9 - 3.5 g/dL    A-G Ratio 1.0 g/dL   HCG QUANTITATIVE SERUM   Result Value Ref Range    Bhcg 94387.0 (H) 0.0 - 5.0 mIU/mL   URINALYSIS CULTURE, IF INDICATED   Result Value Ref Range    Color Yellow     Character Clear     Specific Gravity 1.031 <1.035    Ph 6.0 5.0-8.0    Glucose Negative Negative mg/dL    Ketones Negative Negative mg/dL    Protein Negative Negative mg/dL    Bilirubin Negative Negative    Nitrite Negative Negative    Leukocyte Esterase Negative Negative    Occult Blood Trace (A) Negative    Micro Urine Req Microscopic     Culture Indicated Yes UA Culture   RH TYPE FOR RHOGAM FROM E.D.   Result Value Ref Range    Emergency Department Rh Typing POS     Number Of Rh Doses Indicated ZERO    URINE MICROSCOPIC (W/UA)   Result Value Ref Range    WBC 5-10 (A) /hpf    RBC 0-2 /hpf    Bacteria Many (A) None /hpf    Epithelial Cells Moderate (A) /hpf    Ca Oxalate Crystal Few /hpf    Hyaline Cast 6-10 (A) /lpf   ESTIMATED GFR   Result Value Ref Range    GFR If African American >60 >60 mL/min/1.73 m 2    GFR If Non African American >60 >60 mL/min/1.73 m 2      All labs reviewed by me.    RADIOLOGY  US-OB PELVIS TRANSVAGINAL   Final Result      1.  11 week 6 day intrauterine pregnancy      2.  5.4 cm right adnexal cyst        The radiologist's interpretation of all radiological studies have been reviewed by me.    COURSE & MEDICAL DECISION MAKING  Pertinent Labs & Imaging studies reviewed. (See chart for  details)        6:01 PM Patient seen and examined at bedside. The patient presents with abdominal cramping, and the differential diagnosis includes but is not limited to , threatened miscarriage, early pregnancy, ovarian cyst, ovarian torsion, UTI, or GI etiology.. Ordered for US-OB pelvis transvaginal, HCG quantitative serum, urinalysis culture, RH type for rhogam, CBC, CMP  to evaluate. Ordered Omnicef 300 mg.    8:44 PM Patient rechecked at bedside. The patient was updated on diagnostic test results as seen above. The patient will be discharged, status improved, with instructions regarding supportive care and medications. Instructions were given for appropriate follow-up. Discussed indications for seeking immediate medical attention. Patient was given the opportunity for questions. The patient understands and agrees.      Patient has 11 week 6 day IUP, which correlates to her Quant.  She has some bacteria and white cells in urine, which we will treat with Omnicef.  She doesn't have any bleeding.  She denies any STD risk factors.  I don't think this is an STD.  I don't think pelvic examination for this time.  She has no mucousy or abnormal discharge.  She reports.  She does not have signs of a kidney stone.  She doesn't have much in the way of vomiting or diarrhea.  I don't think this is just intestinal problem.  She does have an ovarian cyst on the right and left.  At this point, I think this is cramping associate with early pregnancy.  I will treat her for asymptomatic bacteriuria.  We'll give return precautions for abdominal pain, pregnancy, threatened miscarriage and UTI.  Follow up with the pregnancy Center.  She should return to the ER for pain, fever, vomiting or other concerns.  She is agreeable to plan.  Questions are answered.  She is discharged in good condition.     The patient will return for new or worsening symptoms and is stable at the time of discharge.    The patient is referred to a primary  physician for blood pressure management, diabetic screening, and for all other preventative health concerns.    DISPOSITION:  Patient will be discharged home in stable condition.    FOLLOW UP:  [facility], M[facility]  [address] [place] [address]  [place] [address]  [phone]    Schedule an appointment as soon as possible for a visit in 3 days        OUTPATIENT MEDICATIONS:  New Prescriptions    CEFDINIR (OMNICEF) 300 MG CAP    Take 1 Cap by mouth 2 times a day for 7 days.          FINAL IMPRESSION  1. Abdominal cramping complicating pregnancy    2. , threatened    3. Asymptomatic bacteriuria    4. Situational anxiety          Samuel JACKSON (Scribe), am scribing for, and in the presence of, Rodger Camp M.D..    Electronically signed by: Samuel Gonzalez (Scribe), 2017    Rodger JACKSON M.D. personally performed the services described in this documentation, as scribed by Samuel Gonzalez in my presence, and it is both accurate and complete.    The note accurately reflects work and decisions made by me.  Rodger Camp  2017  9:54 PM

## 2017-07-08 NOTE — DISCHARGE INSTRUCTIONS
U had some bacteria in your urine, therefore you're being prescribed antibiotics.  Return to the ER here more abdominal pain, if you develop vaginal bleeding, fever, or other concerns.  Follow up with the pregnancy Center.      Abdominal Pain During Pregnancy  Belly (abdominal) pain is common during pregnancy. Most of the time, it is not a serious problem. Other times, it can be a sign that something is wrong with the pregnancy. Always tell your doctor if you have belly pain.  HOME CARE  Monitor your belly pain for any changes. The following actions may help you feel better:  · Do not have sex (intercourse) or put anything in your vagina until you feel better.  · Rest until your pain stops.  · Drink clear fluids if you feel sick to your stomach (nauseous). Do not eat solid food until you feel better.  · Only take medicine as told by your doctor.  · Keep all doctor visits as told.  GET HELP RIGHT AWAY IF:   · You are bleeding, leaking fluid, or pieces of tissue come out of your vagina.  · You have more pain or cramping.  · You keep throwing up (vomiting).  · You have pain when you pee (urinate) or have blood in your pee.  · You have a fever.  · You do not feel your baby moving as much.  · You feel very weak or feel like passing out.  · You have trouble breathing, with or without belly pain.  · You have a very bad headache and belly pain.  · You have fluid leaking from your vagina and belly pain.  · You keep having watery poop (diarrhea).  · Your belly pain does not go away after resting, or the pain gets worse.  MAKE SURE YOU:   · Understand these instructions.  · Will watch your condition.  · Will get help right away if you are not doing well or get worse.     This information is not intended to replace advice given to you by your health care provider. Make sure you discuss any questions you have with your health care provider.     Document Released: 12/06/2010 Document Revised: 08/20/2014 Document Reviewed:  07/17/2014  [employer] Interactive Patient Education ©2016 [employer]        Threatened Miscarriage  A threatened miscarriage occurs when you have vaginal bleeding during your first 20 weeks of pregnancy but the pregnancy has not ended. If you have vaginal bleeding during this time, your health care provider will do tests to make sure you are still pregnant. If the tests show you are still pregnant and the developing baby (fetus) inside your womb (uterus) is still growing, your condition is considered a threatened miscarriage.  A threatened miscarriage does not mean your pregnancy will end, but it does increase the risk of losing your pregnancy (complete miscarriage).  CAUSES   The cause of a threatened miscarriage is usually not known. If you go on to have a complete miscarriage, the most common cause is an abnormal number of chromosomes in the developing baby. Chromosomes are the structures inside cells that hold all your genetic material.  Some causes of vaginal bleeding that do not result in miscarriage include:  · Having sex.  · Having an infection.  · Normal hormone changes of pregnancy.  · Bleeding that occurs when an egg implants in your uterus.  RISK FACTORS  Risk factors for bleeding in early pregnancy include:  · Obesity.  · Smoking.  · Drinking excessive amounts of alcohol or caffeine.  · Recreational drug use.  SIGNS AND SYMPTOMS  · Light vaginal bleeding.  · Mild abdominal pain or cramps.  DIAGNOSIS   If you have bleeding with or without abdominal pain before 20 weeks of pregnancy, your health care provider will do tests to check whether you are still pregnant. One important test involves using sound waves and a computer (ultrasound) to create images of the inside of your uterus. Other tests include an internal exam of your vagina and uterus (pelvic exam) and measurement of your baby's heart rate.   You may be diagnosed with a threatened miscarriage if:  · Ultrasound testing shows you are still  pregnant.  · Your baby's heart rate is strong.  · A pelvic exam shows that the opening between your uterus and your vagina (cervix) is closed.  · Your heart rate and blood pressure are stable.  · Blood tests confirm you are still pregnant.  TREATMENT   No treatments have been shown to prevent a threatened miscarriage from going on to a complete miscarriage. However, the right home care is important.   HOME CARE INSTRUCTIONS   · Make sure you keep all your appointments for prenatal care. This is very important.  · Get plenty of rest.  · Do not have sex or use tampons if you have vaginal bleeding.  · Do not douche.  · Do not smoke or use recreational drugs.  · Do not drink alcohol.  · Avoid caffeine.  SEEK MEDICAL CARE IF:  · You have light vaginal bleeding or spotting while pregnant.  · You have abdominal pain or cramping.  · You have a fever.  SEEK IMMEDIATE MEDICAL CARE IF:  · You have heavy vaginal bleeding.  · You have blood clots coming from your vagina.  · You have severe low back pain or abdominal cramps.  · You have fever, chills, and severe abdominal pain.  MAKE SURE YOU:  · Understand these instructions.  · Will watch your condition.  · Will get help right away if you are not doing well or get worse.     This information is not intended to replace advice given to you by your health care provider. Make sure you discuss any questions you have with your health care provider.     Document Released: 12/18/2006 Document Revised: 12/23/2014 Document Reviewed: 10/14/2014  [employer] Interactive Patient Education ©2016 [employer]      Urinary Tract Infection  Urinary tract infections (UTIs) can develop anywhere along your urinary tract. Your urinary tract is your body's drainage system for removing wastes and extra water. Your urinary tract includes two kidneys, two ureters, a bladder, and a urethra. Your kidneys are a pair of bean-shaped organs. Each kidney is about the size of your fist. They are located below  your ribs, one on each side of your spine.  CAUSES  Infections are caused by microbes, which are microscopic organisms, including fungi, viruses, and bacteria. These organisms are so small that they can only be seen through a microscope. Bacteria are the microbes that most commonly cause UTIs.  SYMPTOMS   Symptoms of UTIs may vary by age and gender of the patient and by the location of the infection. Symptoms in young women typically include a frequent and intense urge to urinate and a painful, burning feeling in the bladder or urethra during urination. Older women and men are more likely to be tired, shaky, and weak and have muscle aches and abdominal pain. A fever may mean the infection is in your kidneys. Other symptoms of a kidney infection include pain in your back or sides below the ribs, nausea, and vomiting.  DIAGNOSIS  To diagnose a UTI, your caregiver will ask you about your symptoms. Your caregiver also will ask to provide a urine sample. The urine sample will be tested for bacteria and white blood cells. White blood cells are made by your body to help fight infection.  TREATMENT   Typically, UTIs can be treated with medication. Because most UTIs are caused by a bacterial infection, they usually can be treated with the use of antibiotics. The choice of antibiotic and length of treatment depend on your symptoms and the type of bacteria causing your infection.  HOME CARE INSTRUCTIONS  · If you were prescribed antibiotics, take them exactly as your caregiver instructs you. Finish the medication even if you feel better after you have only taken some of the medication.  · Drink enough water and fluids to keep your urine clear or pale yellow.  · Avoid caffeine, tea, and carbonated beverages. They tend to irritate your bladder.  · Empty your bladder often. Avoid holding urine for long periods of time.  · Empty your bladder before and after sexual intercourse.  · After a bowel movement, women should cleanse from  front to back. Use each tissue only once.  SEEK MEDICAL CARE IF:   · You have back pain.  · You develop a fever.  · Your symptoms do not begin to resolve within 3 days.  SEEK IMMEDIATE MEDICAL CARE IF:   · You have severe back pain or lower abdominal pain.  · You develop chills.  · You have nausea or vomiting.  · You have continued burning or discomfort with urination.  MAKE SURE YOU:   · Understand these instructions.  · Will watch your condition.  · Will get help right away if you are not doing well or get worse.     This information is not intended to replace advice given to you by your health care provider. Make sure you discuss any questions you have with your health care provider.     Document Released: 09/27/2006 Document Revised: 01/08/2016 Document Reviewed: 01/25/2013  [employer] Interactive Patient Education ©2016 [employer]

## 2017-07-09 LAB
BACTERIA UR CULT: NORMAL
SIGNIFICANT IND 70042: NORMAL
SITE SITE: NORMAL
SOURCE SOURCE: NORMAL

## 2017-08-09 ENCOUNTER — GYNECOLOGY VISIT (OUTPATIENT)
Dept: OBGYN | Facility: CLINIC | Age: 32
End: 2017-08-09
Payer: COMMERCIAL

## 2017-08-09 VITALS
HEIGHT: 65 IN | SYSTOLIC BLOOD PRESSURE: 118 MMHG | BODY MASS INDEX: 44.82 KG/M2 | WEIGHT: 269 LBS | DIASTOLIC BLOOD PRESSURE: 72 MMHG

## 2017-08-09 DIAGNOSIS — N93.8 DUB (DYSFUNCTIONAL UTERINE BLEEDING): ICD-10-CM

## 2017-08-09 DIAGNOSIS — Z34.81 PRENATAL CARE, SUBSEQUENT PREGNANCY, FIRST TRIMESTER: ICD-10-CM

## 2017-08-09 PROCEDURE — 76857 US EXAM PELVIC LIMITED: CPT | Performed by: OBSTETRICS & GYNECOLOGY

## 2017-08-09 PROCEDURE — 99203 OFFICE O/P NEW LOW 30 MIN: CPT | Mod: 25 | Performed by: OBSTETRICS & GYNECOLOGY

## 2017-08-09 NOTE — MR AVS SNAPSHOT
"        Chey Guo   2017 2:30 PM   Gynecology Visit   MRN: [id number]    Department:  [facility]   Dept Phone:  [phone]    Description:  Female : 1985   Provider:  Nila Duarte M.D.           Reason for Visit     Other DUB      Allergies as of 2017     No Known Allergies      You were diagnosed with     Prenatal care, subsequent pregnancy, first trimester   [[id number]]         Vital Signs     Blood Pressure Height Weight Body Mass Index Breastfeeding?       118/72 mmHg 1.651 m (5' 5\") 122.018 kg (269 lb) 44.76 kg/m2 No       Basic Information     Date Of Birth Sex Race Ethnicity Preferred Language    1985 Female  or  Non- English      Your appointments     Aug 24, 2017  3:30 PM   OB Follow Up with Christo Urban M.D.   [facility] ([address])    [address] [place] [id number] [phone]            Sep 19, 2017  9:45 AM   OB Follow Up with Nila Duarte M.D.   [facility] ([address])    [address] [place] [address] [id number] [phone]            Oct 18, 2017  3:15 PM   OB Follow Up with Dilcia Jackson M.D.   [facility] ([address])    [address] [place] [address] [id number] [phone]              Health Maintenance        Date Due Completion Dates    IMM DTaP/Tdap/Td Vaccine (1 - Tdap) 6/15/2005 ---    PAP SMEAR 6/15/2007 ---    IMM INFLUENZA (1) 2017 ---            Current Immunizations     No immunizations on file.      Below and/or attached are the medications your provider expects you to take. Review all of your home medications and newly ordered medications with your provider and/or pharmacist. Follow medication instructions as directed by your provider and/or pharmacist. Please keep your medication list with you and share with your provider. Update the information when medications are discontinued, " doses are changed, or new medications (including over-the-counter products) are added; and carry medication information at all times in the event of emergency situations     Allergies:  No Known Allergies          Medications  Valid as of: August 09, 2017 -  3:39 PM    Generic Name Brand Name Tablet Size Instructions for use    Prenatal Multivit-Min-Fe-FA   Take  by mouth.        .                 Medicines prescribed today were sent to:     [facility] [address] [place] [address], [place] [address] [facility] [address] [address]    Phone: [phone] Fax: [phone]    Open 24 Hours?: No      Medication refill instructions:       If your prescription bottle indicates you have medication refills left, it is not necessary to call your provider’s office. Please contact your pharmacy and they will refill your medication.    If your prescription bottle indicates you do not have any refills left, you may request refills at any time through one of the following ways: The online Telerivet system (except Urgent Care), by calling your provider’s office, or by asking your pharmacy to contact your provider’s office with a refill request. Medication refills are processed only during regular business hours and may not be available until the next business day. Your provider may request additional information or to have a follow-up visit with you prior to refilling your medication.   *Please Note: Medication refills are assigned a new Rx number when refilled electronically. Your pharmacy may indicate that no refills were authorized even though a new prescription for the same medication is available at the pharmacy. Please request the medicine by name with the pharmacy before contacting your provider for a refill.           Telerivet Access Code: [email or web address]  Expires: 9/8/2017  3:39 PM    Telerivet  A secure, online tool to manage your health information     [employer]’s [employer] is a  secure, online tool that connects you to your personalized health information from the privacy of your home -- day or night - making it very easy for you to manage your healthcare. Once the activation process is completed, you can even access your medical information using the Inmagic annmarie, which is available for free in the Apple Annmarie store or Google Play store.     Inmagic provides the following levels of access (as shown below):   My Chart Features   [facility] Doctor [facility]  Specialists [facility]  Urgent  Care Non-[facility]  Primary Care  Doctor   Email your healthcare team securely and privately 24/7 X X X    Manage appointments: schedule your next appointment; view details of past/upcoming appointments X      Request prescription refills. X      View recent personal medical records, including lab and immunizations X X X X   View health record, including health history, allergies, medications X X X X   Read reports about your outpatient visits, procedures, consult and ER notes X X X X   See your discharge summary, which is a recap of your hospital and/or ER visit that includes your diagnosis, lab results, and care plan. X X       How to register for Inmagic:  1. Go to  [email or web address].  2. Click on the Sign Up Now box, which takes you to the New Member Sign Up page. You will need to provide the following information:  a. Enter your Inmagic Access Code exactly as it appears at the top of this page. (You will not need to use this code after you’ve completed the sign-up process. If you do not sign up before the expiration date, you must request a new code.)   b. Enter your date of birth.   c. Enter your home email address.   d. Click Submit, and follow the next screen’s instructions.  3. Create a [employer] ID. This will be your Inmagic login ID and cannot be changed, so think of one that is secure and easy to remember.  4. Create a Inmagic password. You can change your password at any time.  5. Enter  your Password Reset Question and Answer. This can be used at a later time if you forget your password.   6. Enter your e-mail address. This allows you to receive e-mail notifications when new information is available in [employer].  7. Click Sign Up. You can now view your health information.    For assistance activating your SoLatina account, call [phone]

## 2017-08-10 ENCOUNTER — HOSPITAL ENCOUNTER (OUTPATIENT)
Facility: MEDICAL CENTER | Age: 32
End: 2017-08-10
Attending: OBSTETRICS & GYNECOLOGY
Payer: COMMERCIAL

## 2017-08-10 DIAGNOSIS — Z34.81 PRENATAL CARE, SUBSEQUENT PREGNANCY, FIRST TRIMESTER: ICD-10-CM

## 2017-08-10 LAB — IN CLINIC OB SCAN: NORMAL

## 2017-08-10 PROCEDURE — 88175 CYTOPATH C/V AUTO FLUID REDO: CPT

## 2017-08-10 PROCEDURE — 87591 N.GONORRHOEAE DNA AMP PROB: CPT

## 2017-08-10 PROCEDURE — 87491 CHLMYD TRACH DNA AMP PROBE: CPT

## 2017-08-10 NOTE — PROGRESS NOTES
"CC: Confirmation of Pregnancy    HPI: Pt is a 33 yo  lmp march?  who presents for evaluation of amenorrhea.  She has had no bleeding since her last menses.  A urine pregnancy test was positive.  She denies vaginal spotting or pain.  She notes nausea and vomiting.      ROS: negative for dizziness, SOB, chest pain, palpitations, dysuria, vaginal discharge.    Blood pressure 118/72, height 1.651 m (5' 5\"), weight 122.018 kg (269 lb), not currently breastfeeding.    GENERAL: Alert, in no apparent distress  PSYCHIATRIC: Appropriate affect, intact insight and judgement.  ABDOMEN: Soft, nontender, nondistended.  16 wk size uterus. No hepatosplenomegaly.   No rebound or guarding.  No inguinal lymphadenopathy.  BACK: No CVA tenderness  EXTREMITIES: No edema, no calf tenderness.    GENITOURINARY:  Normal external genitalia, no lesions.  Normal urethral meatus, no masses or tenderness.  Normal bladder without fullness or masses.  Vagina well estrogenized, no vaginal discharge or lesions.  Cervix normal length, nontender.  Uterus normal size, shape, and contour, nontender.  Adnexa nontender, no masses.  Normal anus and perineum.      Limited Abdominal US - performed and interpreted by me    Single gestational sac. Yolk sac not visualized.   Placenta posterior    Lauren IUP with + fetal cardiac activity noted.    BPD = 38.0 mm, unable to obtain AC and FL due to morbid obesity and breech position, consistent with 16+6/7 wks.    KATIE by US 19.    Ovaries and cervix grossly normal.  Right ovarian cyst 2.9 cm.  Cervical length >3.5 cm.       ASSESSMENT/PLAN:  1. DUB visit - Lauren IUP at 16+6/7 wks.  Prenatal Vitamins.  F/U for NOB appointment next available.  2. Hx of previous C/S for failed IOL.  Op report requested.   3. Hypothyroidism - on synthroid.  Check TSH with new ob labs.   "

## 2017-08-11 LAB
C TRACH DNA GENITAL QL NAA+PROBE: NEGATIVE
CYTOLOGY REG CYTOL: NORMAL
N GONORRHOEA DNA GENITAL QL NAA+PROBE: NEGATIVE
SPECIMEN SOURCE: NORMAL

## 2017-08-24 ENCOUNTER — INITIAL PRENATAL (OUTPATIENT)
Dept: OBGYN | Facility: CLINIC | Age: 32
End: 2017-08-24
Payer: COMMERCIAL

## 2017-08-24 VITALS — BODY MASS INDEX: 45.26 KG/M2 | SYSTOLIC BLOOD PRESSURE: 110 MMHG | DIASTOLIC BLOOD PRESSURE: 62 MMHG | WEIGHT: 272 LBS

## 2017-08-24 DIAGNOSIS — Z34.82 PRENATAL CARE, SUBSEQUENT PREGNANCY, SECOND TRIMESTER: ICD-10-CM

## 2017-08-24 PROBLEM — Z34.80 PRENATAL CARE, SUBSEQUENT PREGNANCY: Status: ACTIVE | Noted: 2017-08-24

## 2017-08-24 PROCEDURE — 59401 PR NEW OB VISIT: CPT | Performed by: OBSTETRICS & GYNECOLOGY

## 2017-08-24 NOTE — PROGRESS NOTES
Initial OB;    Chey Guo is 32 y.o.  female ,No LMP recorded. Patient is pregnant. at 18w6d. She denies current complaints.    Past OB history-  OB History    Para Term  AB SAB TAB Ectopic Multiple Living   2 1 1       1      # Outcome Date GA Lbr Leonardo/2nd Weight Sex Delivery Anes PTL Lv   2 Current            1 Term 07 42w0d  3.742 kg (8 lb 4 oz) M CS-Unspec Spinal N Y        Past medical history-  Past Medical History   Diagnosis Date   • Thyroid disease      Past surgical history-  Past Surgical History   Procedure Laterality Date   • Primary c section            Allergies-Review of patient's allergies indicates no known allergies.  Medications-  No current facility-administered medications for this visit.       Last reviewed on 8/10/2017  9:34 AM by Nila Duarte M.D.      Size equals dates, positive fetal heart tones    See prenatal physical;    Impression;  Viable pregnancy at 18-6/7 weeks    Plan;  Followup in 4 weeks  AFP tetra today  OB ultrasound-fetal survey referral given to schedule  Prenatal labs today

## 2017-08-24 NOTE — MR AVS SNAPSHOT
Chey Guo   2017 3:30 PM   Initial Prenatal   MRN: [id number]    Department:  [facility]   Dept Phone:  [phone]    Description:  Female : 1985   Provider:  Christo Urban M.D.           Allergies as of 2017     No Known Allergies      You were diagnosed with     Prenatal care, subsequent pregnancy, second trimester   [[id number]]         Vital Signs     Blood Pressure Weight                110/62 mmHg 123.378 kg (272 lb)          Basic Information     Date Of Birth Sex Race Ethnicity Preferred Language    1985 Female  or  Non- English      Your appointments     Sep 19, 2017  9:45 AM   OB Follow Up with Nila Duarte M.D.   [facility] ([address])    [address] [place] [address], [address]  [facility] [id number] [phone]            Oct 18, 2017  3:15 PM   OB Follow Up with Dilcia Jackson M.D.   [facility] ([address])    [address] [place] [address], [address]  [facility] [id number] [phone]              Problem List              ICD-10-CM Priority Class Noted - Resolved    Prenatal care, subsequent pregnancy Z34.80   2017 - Present      Health Maintenance        Date Due Completion Dates    IMM DTaP/Tdap/Td Vaccine (1 - Tdap) 6/15/2005 ---    IMM INFLUENZA (1) 2017 ---    PAP SMEAR 8/10/2020 8/10/2017            Current Immunizations     No immunizations on file.      Below and/or attached are the medications your provider expects you to take. Review all of your home medications and newly ordered medications with your provider and/or pharmacist. Follow medication instructions as directed by your provider and/or pharmacist. Please keep your medication list with you and share with your provider. Update the information when medications are discontinued, doses are changed, or new medications (including over-the-counter products) are added; and carry medication information at all times in  the event of emergency situations     Allergies:  No Known Allergies          Medications  Valid as of: August 24, 2017 -  4:28 PM    Generic Name Brand Name Tablet Size Instructions for use    Prenatal Multivit-Min-Fe-FA   Take  by mouth.        .                 Medicines prescribed today were sent to:     [facility] [address] [place] [address] [place], [place] - [address] [facility] [place] [address]    [address] [place] [address]    Phone: [phone] Fax: [phone]    Open 24 Hours?: No      Medication refill instructions:       If your prescription bottle indicates you have medication refills left, it is not necessary to call your provider’s office. Please contact your pharmacy and they will refill your medication.    If your prescription bottle indicates you do not have any refills left, you may request refills at any time through one of the following ways: The online HopeLab system (except Urgent Care), by calling your provider’s office, or by asking your pharmacy to contact your provider’s office with a refill request. Medication refills are processed only during regular business hours and may not be available until the next business day. Your provider may request additional information or to have a follow-up visit with you prior to refilling your medication.   *Please Note: Medication refills are assigned a new Rx number when refilled electronically. Your pharmacy may indicate that no refills were authorized even though a new prescription for the same medication is available at the pharmacy. Please request the medicine by name with the pharmacy before contacting your provider for a refill.        Your To Do List     Future Labs/Procedures Complete By Expires    AFP TETRA  As directed 8/24/2018    PRENATAL PANEL 3+HIV+UACXI  As directed 8/24/2018    US-OB 2ND 3RD TRI COMPLETE  As directed 8/24/2018         HopeLab Access Code: [email or web address]  Expires: 9/8/2017  3:39 PM    [facility]  A secure, online tool  to manage your health information     [employer]’s Neoant[employer]ics® is a secure, online tool that connects you to your personalized health information from the privacy of your home -- day or night - making it very easy for you to manage your healthcare. Once the activation process is completed, you can even access your medical information using the Neoantigenics annmarie, which is available for free in the Apple Annmarie store or Google Play store.     Neoantigenics provides the following levels of access (as shown below):   My Chart Features   [facility] Doctor [facility]  Specialists [facility]  Urgent  Care Non-[facility]  Primary Care  Doctor   Email your healthcare team securely and privately 24/7 X X X    Manage appointments: schedule your next appointment; view details of past/upcoming appointments X      Request prescription refills. X      View recent personal medical records, including lab and immunizations X X X X   View health record, including health history, allergies, medications X X X X   Read reports about your outpatient visits, procedures, consult and ER notes X X X X   See your discharge summary, which is a recap of your hospital and/or ER visit that includes your diagnosis, lab results, and care plan. X X       How to register for Neoantigenics:  1. Go to  [email or web address].  2. Click on the Sign Up Now box, which takes you to the New Member Sign Up page. You will need to provide the following information:  a. Enter your Neoantigenics Access Code exactly as it appears at the top of this page. (You will not need to use this code after you’ve completed the sign-up process. If you do not sign up before the expiration date, you must request a new code.)   b. Enter your date of birth.   c. Enter your home email address.   d. Click Submit, and follow the next screen’s instructions.  3. Create a Neoantigenics ID. This will be your Neoantigenics login ID and cannot be changed, so think of one that is secure and easy to remember.  4. Create a  DoublePositive password. You can change your password at any time.  5. Enter your Password Reset Question and Answer. This can be used at a later time if you forget your password.   6. Enter your e-mail address. This allows you to receive e-mail notifications when new information is available in DoublePositive.  7. Click Sign Up. You can now view your health information.    For assistance activating your DoublePositive account, call [phone]

## 2017-08-25 ENCOUNTER — HOSPITAL ENCOUNTER (OUTPATIENT)
Dept: LAB | Facility: MEDICAL CENTER | Age: 32
End: 2017-08-25
Attending: OBSTETRICS & GYNECOLOGY
Payer: COMMERCIAL

## 2017-08-25 DIAGNOSIS — Z34.82 PRENATAL CARE, SUBSEQUENT PREGNANCY, SECOND TRIMESTER: ICD-10-CM

## 2017-08-25 LAB
ABO GROUP BLD: NORMAL
APPEARANCE UR: ABNORMAL
BACTERIA #/AREA URNS HPF: ABNORMAL /HPF
BASOPHILS # BLD AUTO: 0.4 % (ref 0–1.8)
BASOPHILS # BLD: 0.04 K/UL (ref 0–0.12)
BILIRUB UR QL STRIP.AUTO: NEGATIVE
BLD GP AB SCN SERPL QL: NORMAL
CAOX CRY #/AREA URNS HPF: ABNORMAL /HPF
COLOR UR: YELLOW
CULTURE IF INDICATED INDCX: YES UA CULTURE
EOSINOPHIL # BLD AUTO: 0.23 K/UL (ref 0–0.51)
EOSINOPHIL NFR BLD: 2.2 % (ref 0–6.9)
EPI CELLS #/AREA URNS HPF: ABNORMAL /HPF
ERYTHROCYTE [DISTWIDTH] IN BLOOD BY AUTOMATED COUNT: 40.6 FL (ref 35.9–50)
GLUCOSE UR STRIP.AUTO-MCNC: NEGATIVE MG/DL
HBV SURFACE AG SER QL: NEGATIVE
HCT VFR BLD AUTO: 38.8 % (ref 37–47)
HGB BLD-MCNC: 12.9 G/DL (ref 12–16)
HIV 1+2 AB+HIV1 P24 AG SERPL QL IA: NON REACTIVE
IMM GRANULOCYTES # BLD AUTO: 0.04 K/UL (ref 0–0.11)
IMM GRANULOCYTES NFR BLD AUTO: 0.4 % (ref 0–0.9)
KETONES UR STRIP.AUTO-MCNC: NEGATIVE MG/DL
LEUKOCYTE ESTERASE UR QL STRIP.AUTO: NEGATIVE
LYMPHOCYTES # BLD AUTO: 2.39 K/UL (ref 1–4.8)
LYMPHOCYTES NFR BLD: 23.3 % (ref 22–41)
MCH RBC QN AUTO: 27.8 PG (ref 27–33)
MCHC RBC AUTO-ENTMCNC: 33.2 G/DL (ref 33.6–35)
MCV RBC AUTO: 83.6 FL (ref 81.4–97.8)
MICRO URNS: ABNORMAL
MONOCYTES # BLD AUTO: 0.7 K/UL (ref 0–0.85)
MONOCYTES NFR BLD AUTO: 6.8 % (ref 0–13.4)
NEUTROPHILS # BLD AUTO: 6.86 K/UL (ref 2–7.15)
NEUTROPHILS NFR BLD: 66.9 % (ref 44–72)
NITRITE UR QL STRIP.AUTO: NEGATIVE
NRBC # BLD AUTO: 0 K/UL
NRBC BLD AUTO-RTO: 0 /100 WBC
PH UR STRIP.AUTO: 6 [PH]
PLATELET # BLD AUTO: 308 K/UL (ref 164–446)
PMV BLD AUTO: 10.5 FL (ref 9–12.9)
PROT UR QL STRIP: NEGATIVE MG/DL
RBC # BLD AUTO: 4.64 M/UL (ref 4.2–5.4)
RBC UR QL AUTO: NEGATIVE
RH BLD: NORMAL
RUBV AB SER QL: 33.8 IU/ML
SP GR UR STRIP.AUTO: 1.03
TREPONEMA PALLIDUM IGG+IGM AB [PRESENCE] IN SERUM OR PLASMA BY IMMUNOASSAY: NON REACTIVE
UROBILINOGEN UR STRIP.AUTO-MCNC: 0.2 MG/DL
WBC # BLD AUTO: 10.3 K/UL (ref 4.8–10.8)
WBC #/AREA URNS HPF: ABNORMAL /HPF

## 2017-08-25 PROCEDURE — 87340 HEPATITIS B SURFACE AG IA: CPT

## 2017-08-25 PROCEDURE — 86901 BLOOD TYPING SEROLOGIC RH(D): CPT

## 2017-08-25 PROCEDURE — 86780 TREPONEMA PALLIDUM: CPT

## 2017-08-25 PROCEDURE — 81511 FTL CGEN ABNOR FOUR ANAL: CPT

## 2017-08-25 PROCEDURE — 86762 RUBELLA ANTIBODY: CPT

## 2017-08-25 PROCEDURE — 86850 RBC ANTIBODY SCREEN: CPT

## 2017-08-25 PROCEDURE — 87389 HIV-1 AG W/HIV-1&-2 AB AG IA: CPT

## 2017-08-25 PROCEDURE — 86900 BLOOD TYPING SEROLOGIC ABO: CPT

## 2017-08-25 PROCEDURE — 81001 URINALYSIS AUTO W/SCOPE: CPT

## 2017-08-25 PROCEDURE — 87086 URINE CULTURE/COLONY COUNT: CPT

## 2017-08-25 PROCEDURE — 85025 COMPLETE CBC W/AUTO DIFF WBC: CPT

## 2017-08-25 PROCEDURE — 36415 COLL VENOUS BLD VENIPUNCTURE: CPT

## 2017-08-27 LAB
BACTERIA UR CULT: NORMAL
SIGNIFICANT IND 70042: NORMAL
SOURCE SOURCE: NORMAL

## 2017-08-28 LAB
# FETUSES US: NORMAL
AFP MOM SERPL: 0.48
AFP SERPL-MCNC: 16 NG/ML
AGE - REPORTED: 31.5 YR
GA METHOD: NORMAL
GA: 19 WEEKS
HCG MOM SERPL: 0.71
HCG SERPL-ACNC: NORMAL IU/L
IDDM PATIENT QL: NO
INHIBIN A MOM SERPL: 0.77
INHIBIN A SERPL-MCNC: 93 PG/ML
INTEGRATED SCN PATIENT-IMP: NORMAL
PATHOLOGY STUDY: NORMAL
U ESTRIOL MOM SERPL: 0.91
U ESTRIOL SERPL-MCNC: 1.26 NG/ML

## 2017-09-09 ENCOUNTER — HOSPITAL ENCOUNTER (OUTPATIENT)
Dept: RADIOLOGY | Facility: MEDICAL CENTER | Age: 32
End: 2017-09-09
Attending: OBSTETRICS & GYNECOLOGY
Payer: COMMERCIAL

## 2017-09-09 DIAGNOSIS — Z34.82 PRENATAL CARE, SUBSEQUENT PREGNANCY, SECOND TRIMESTER: ICD-10-CM

## 2017-09-09 PROCEDURE — 76805 OB US >/= 14 WKS SNGL FETUS: CPT

## 2017-09-15 ENCOUNTER — TELEPHONE (OUTPATIENT)
Dept: OBGYN | Facility: CLINIC | Age: 32
End: 2017-09-15

## 2017-09-15 NOTE — TELEPHONE ENCOUNTER
----- Message from Christo Urban M.D. sent at 9/14/2017  4:47 PM PDT -----  Patient is follow-up ultrasound in 4 weeks to complete fetal survey-order placed

## 2017-10-16 ENCOUNTER — HOSPITAL ENCOUNTER (OUTPATIENT)
Dept: RADIOLOGY | Facility: MEDICAL CENTER | Age: 32
End: 2017-10-16
Attending: OBSTETRICS & GYNECOLOGY
Payer: COMMERCIAL

## 2017-10-16 DIAGNOSIS — Z34.82 PRENATAL CARE, SUBSEQUENT PREGNANCY, SECOND TRIMESTER: ICD-10-CM

## 2017-10-16 PROCEDURE — 76815 OB US LIMITED FETUS(S): CPT

## 2017-10-18 ENCOUNTER — ROUTINE PRENATAL (OUTPATIENT)
Dept: OBGYN | Facility: CLINIC | Age: 32
End: 2017-10-18
Payer: COMMERCIAL

## 2017-10-18 VITALS — BODY MASS INDEX: 45.6 KG/M2 | SYSTOLIC BLOOD PRESSURE: 122 MMHG | WEIGHT: 274 LBS | DIASTOLIC BLOOD PRESSURE: 62 MMHG

## 2017-10-18 DIAGNOSIS — Z34.82 PRENATAL CARE, SUBSEQUENT PREGNANCY IN SECOND TRIMESTER: ICD-10-CM

## 2017-10-18 PROCEDURE — 90040 PR PRENATAL FOLLOW UP: CPT | Performed by: OBSTETRICS & GYNECOLOGY

## 2017-10-18 NOTE — PROGRESS NOTES
Chey Guo is a 32 y.o.  at 26w5d here today for obstetrical visit.  Patient is without complaints.    She reports good fetal movement.  She denies vaginal bleeding.  She denies rupture of membranes.  She denies contractions.     has Prenatal care, subsequent pregnancy on her problem list.    Discussed TOLAC   Patient has consent and will consider     Discussed with patient appropriate candidacy for trial of labor after  delivery. Patient does have a previous  ×1, 2 layer closure. Patient is appropriate candidate for trial of labor if so desires. After discussion of risks and benefits associated with vaginal birth after  including risk of uterine rupture being one in 1000, also discussed with patient the possibility that if uterus ruptures may be impossible for us to deliver the baby without having fetal compromise. Also discussed the risks of repeat  delivery. Discussed office policy regarding  which is patient should go into spontaneous labor by 40 weeks otherwise repeat  will be scheduled at 40 weeks.    A/P IUP at 26w5d  AFP   1 hour glucola done  Rhogam done  GBS     F/U in 3 weeks

## 2017-11-07 ENCOUNTER — HOSPITAL ENCOUNTER (OUTPATIENT)
Dept: LAB | Facility: MEDICAL CENTER | Age: 32
End: 2017-11-07
Attending: OBSTETRICS & GYNECOLOGY
Payer: COMMERCIAL

## 2017-11-07 DIAGNOSIS — Z34.82 PRENATAL CARE, SUBSEQUENT PREGNANCY IN SECOND TRIMESTER: ICD-10-CM

## 2017-11-07 LAB
ERYTHROCYTE [DISTWIDTH] IN BLOOD BY AUTOMATED COUNT: 42.8 FL (ref 35.9–50)
GLUCOSE 1H P 50 G GLC PO SERPL-MCNC: 83 MG/DL (ref 70–139)
HCT VFR BLD AUTO: 38.7 % (ref 37–47)
HGB BLD-MCNC: 13 G/DL (ref 12–16)
MCH RBC QN AUTO: 28.8 PG (ref 27–33)
MCHC RBC AUTO-ENTMCNC: 33.6 G/DL (ref 33.6–35)
MCV RBC AUTO: 85.8 FL (ref 81.4–97.8)
PLATELET # BLD AUTO: 285 K/UL (ref 164–446)
PMV BLD AUTO: 10.6 FL (ref 9–12.9)
RBC # BLD AUTO: 4.51 M/UL (ref 4.2–5.4)
TREPONEMA PALLIDUM IGG+IGM AB [PRESENCE] IN SERUM OR PLASMA BY IMMUNOASSAY: NON REACTIVE
WBC # BLD AUTO: 9.6 K/UL (ref 4.8–10.8)

## 2017-11-07 PROCEDURE — 85027 COMPLETE CBC AUTOMATED: CPT

## 2017-11-07 PROCEDURE — 36415 COLL VENOUS BLD VENIPUNCTURE: CPT

## 2017-11-07 PROCEDURE — 86780 TREPONEMA PALLIDUM: CPT

## 2017-11-07 PROCEDURE — 82950 GLUCOSE TEST: CPT

## 2017-11-08 ENCOUNTER — ROUTINE PRENATAL (OUTPATIENT)
Dept: OBGYN | Facility: CLINIC | Age: 32
End: 2017-11-08
Payer: COMMERCIAL

## 2017-11-08 VITALS — DIASTOLIC BLOOD PRESSURE: 70 MMHG | SYSTOLIC BLOOD PRESSURE: 124 MMHG | BODY MASS INDEX: 46.43 KG/M2 | WEIGHT: 279 LBS

## 2017-11-08 DIAGNOSIS — O34.219 PREVIOUS CESAREAN DELIVERY, ANTEPARTUM CONDITION OR COMPLICATION: ICD-10-CM

## 2017-11-08 DIAGNOSIS — O09.899 SUPERVISION OF OTHER HIGH RISK PREGNANCY, ANTEPARTUM: ICD-10-CM

## 2017-11-08 PROCEDURE — 90040 PR PRENATAL FOLLOW UP: CPT | Performed by: OBSTETRICS & GYNECOLOGY

## 2017-11-08 NOTE — LETTER
"Count Your Baby's Movements  Another step to a healthy delivery                Dept: [phone]    How Many Weeks Pregnant? 29W5D    Date to Begin Countin2017              How to use this chart    One way for your physician to keep track of your baby's health is by knowing how often the baby moves (or \"kicks\") in your womb.  You can help your physician to do this by using this chart every day.    Every day, you should see how many hours it takes for your baby to move 10 times.  Start in the morning, as soon as you get up.    · First, write down the time your baby moves until you get to 10.  · Check off one box every time your baby moves until you get to 10.  · Write down the time you finished counting in the last column.  · Total how long it took to count up all 10 movements.  · Finally, fill in the box that shows how long this took.  After counting 10 movements, you no longer have to count any more that day.  The next morning, just start counting again as soon as you get up.    What should you call a \"movement\"?  It is hard to say, because it will feel different from one mother to another and from one pregnancy to the next.  The important thing is that you count the movements the same way throughout your pregnancy.  If you have more questions, you should ask your physician.    Count carefully every day!  SAMPLE:  Week 28    How many hours did it take to feel 10 movements?       Start  Time     1     2     3     4     5     6     7     8     9     10   Finish Time   Mon 8:20 ·  ·  ·  ·  ·  ·  ·  ·  ·  ·  11:40                  Sat               Sun                 IMPORTANT: You should contact your physician if it takes more than two hours for you to feel 10 movements.  Each morning, write down the time and start to count the movements of your baby.  Keep track by checking off one box every time you feel one movement.  When you have felt 10 \"kicks\", " write down the time you finished counting in the last column.  Then fill in the   box (over the check emmy) for the number of hours it took.  Be sure to read the complete instructions on the previous page.

## 2017-11-08 NOTE — PROGRESS NOTES
OB Follow Up--- High Risk  Prev C/S     obesity      32 y.o. is a 29w5d presents in prenatal follow up.    Subjective:no complaints  . Fetal Movement  positive    Problem List:has Prenatal care, subsequent pregnancy and Previous  delivery, antepartum condition or complication on her problem list.     Objective:   /70   Wt (!) 126.6 kg (279 lb)   BMI 46.43 kg/m²     Abdomen:  S>D  Extremities:Normal        Lab:  Recent Results (from the past 336 hour(s))   CBC WITHOUT DIFFERENTIAL    Collection Time: 17  9:01 AM   Result Value Ref Range    WBC 9.6 4.8 - 10.8 K/uL    RBC 4.51 4.20 - 5.40 M/uL    Hemoglobin 13.0 12.0 - 16.0 g/dL    Hematocrit 38.7 37.0 - 47.0 %    MCV 85.8 81.4 - 97.8 fL    MCH 28.8 27.0 - 33.0 pg    MCHC 33.6 33.6 - 35.0 g/dL    RDW 42.8 35.9 - 50.0 fL    Platelet Count 285 164 - 446 K/uL    MPV 10.6 9.0 - 12.9 fL   GLUCOSE 1HR GESTATIONAL    Collection Time: 17  9:01 AM   Result Value Ref Range    Glucose, Post Dose 83 70 - 139 mg/dL   T.PALLIDUM AB EIA    Collection Time: 17  9:01 AM   Result Value Ref Range    Syphilis, Treponemal Qual Non Reactive Non Reactive         Assessment:    29w5d     Prev C/s , for LGA , records not here   obesity  Early pregnancy warning signs (bleeding,pain,discharge,leaking) discussed.      Discuss policies and procedures for TOLAC , vs  . Patient not apparently sure what she wants . Will need to review , but think she may be better Repeat  Consents given   Plan:  2 weeks  consent forms given   Continue water intake  Ambulate nightly after 37 weeks  Continue Kick counts

## 2017-11-16 ENCOUNTER — TELEPHONE (OUTPATIENT)
Dept: OBGYN | Facility: MEDICAL CENTER | Age: 32
End: 2017-11-16

## 2017-12-01 ENCOUNTER — ROUTINE PRENATAL (OUTPATIENT)
Dept: OBGYN | Facility: CLINIC | Age: 32
End: 2017-12-01
Payer: COMMERCIAL

## 2017-12-01 VITALS — SYSTOLIC BLOOD PRESSURE: 120 MMHG | WEIGHT: 280 LBS | DIASTOLIC BLOOD PRESSURE: 74 MMHG | BODY MASS INDEX: 46.59 KG/M2

## 2017-12-01 DIAGNOSIS — O09.893 SUPERVISION OF OTHER HIGH RISK PREGNANCIES, THIRD TRIMESTER: ICD-10-CM

## 2017-12-01 DIAGNOSIS — O34.219 PREVIOUS CESAREAN DELIVERY, ANTEPARTUM CONDITION OR COMPLICATION: ICD-10-CM

## 2017-12-01 PROCEDURE — 90040 PR PRENATAL FOLLOW UP: CPT | Performed by: OBSTETRICS & GYNECOLOGY

## 2017-12-02 NOTE — PROGRESS NOTES
S: Doing well. reports fetal movement.  denies  vaginal bleeding.  denies  leakage of fluid.  denies contractions.  denies  headache.  denies  nausea/vomiting.   denies  RUQ pain.   denies vision changes.  Questions answered.  Plans to breast feed  Has pre-registered  Pediatrician undecided (goes to [facility])    O: /74   Wt (!) 127 kg (280 lb)   BMI 46.59 kg/m²   Patients' weight gain, fluid intake and exercise level discussed.  Vitals, fundal height , fetal position, and FHR reviewed on flowsheet    Lab:No results found for this or any previous visit (from the past 336 hour(s)).    A/P:  32 y.o.  at 33w0d presents for routine obstetric follow-up.  Previous C/S x 1 --> was considering TOLAC but now declines and wants repeat  section  Declines BTL  Size > dates and/or scan --> maternal obesity    1.  Continue prenatal vitamins.  2.  Fetal kick counts.  3.  Exercise at least 30 minutes daily.  4.  Increase water intake.  5.  Labor precautions educated.  6.  Follow-up in 2 weeks.  7. GBS next appt  8.  Referral placed to auth/schedule repeat C/S on  or

## 2017-12-15 ENCOUNTER — HOSPITAL ENCOUNTER (OUTPATIENT)
Facility: MEDICAL CENTER | Age: 32
End: 2017-12-15
Attending: OBSTETRICS & GYNECOLOGY
Payer: COMMERCIAL

## 2017-12-15 ENCOUNTER — ROUTINE PRENATAL (OUTPATIENT)
Dept: OBGYN | Facility: CLINIC | Age: 32
End: 2017-12-15
Payer: COMMERCIAL

## 2017-12-15 VITALS — DIASTOLIC BLOOD PRESSURE: 62 MMHG | WEIGHT: 283 LBS | BODY MASS INDEX: 47.09 KG/M2 | SYSTOLIC BLOOD PRESSURE: 90 MMHG

## 2017-12-15 DIAGNOSIS — O34.219 PREVIOUS CESAREAN DELIVERY, ANTEPARTUM CONDITION OR COMPLICATION: ICD-10-CM

## 2017-12-15 DIAGNOSIS — O09.893 SUPERVISION OF OTHER HIGH RISK PREGNANCIES, THIRD TRIMESTER: ICD-10-CM

## 2017-12-15 PROCEDURE — 90040 PR PRENATAL FOLLOW UP: CPT | Performed by: OBSTETRICS & GYNECOLOGY

## 2017-12-15 PROCEDURE — 87653 STREP B DNA AMP PROBE: CPT

## 2017-12-15 NOTE — PROGRESS NOTES
Chey Guo is a 32 y.o.  at 35w0d here today for obstetrical visit.  Patient is without complaints.    She reports good fetal movement.  She denies vaginal bleeding.  She denies rupture of membranes.  She denies contractions.     has Prenatal care, subsequent pregnancy; Previous  delivery, antepartum condition or complication; and Supervision of other high risk pregnancies, third trimester on her problem list.    Patient desires    scheduled for     Small abscess which is currently draining noted in the left labia, small amount of purulent discharge is observed  Offer patient incision and drainage today  Patient declines  Discussed using warm compress to continue drainage    A/P IUP at 35w0d  AFP done  1 hour glucola done  Rhogam done  GBS done    F/U in 1 weeks

## 2017-12-16 DIAGNOSIS — O09.893 SUPERVISION OF OTHER HIGH RISK PREGNANCIES, THIRD TRIMESTER: ICD-10-CM

## 2017-12-16 DIAGNOSIS — O34.219 PREVIOUS CESAREAN DELIVERY, ANTEPARTUM CONDITION OR COMPLICATION: ICD-10-CM

## 2017-12-17 LAB — GP B STREP DNA SPEC QL NAA+PROBE: NEGATIVE

## 2017-12-26 ENCOUNTER — ROUTINE PRENATAL (OUTPATIENT)
Dept: OBGYN | Facility: CLINIC | Age: 32
End: 2017-12-26
Payer: COMMERCIAL

## 2017-12-26 VITALS — BODY MASS INDEX: 47.43 KG/M2 | WEIGHT: 285 LBS | SYSTOLIC BLOOD PRESSURE: 102 MMHG | DIASTOLIC BLOOD PRESSURE: 70 MMHG

## 2017-12-26 DIAGNOSIS — O34.219 PREVIOUS CESAREAN DELIVERY, ANTEPARTUM CONDITION OR COMPLICATION: ICD-10-CM

## 2017-12-26 DIAGNOSIS — O09.893 SUPERVISION OF OTHER HIGH RISK PREGNANCIES, THIRD TRIMESTER: ICD-10-CM

## 2017-12-26 PROCEDURE — 90040 PR PRENATAL FOLLOW UP: CPT | Performed by: OBSTETRICS & GYNECOLOGY

## 2017-12-26 NOTE — PROGRESS NOTES
Chey Guo is a 32 y.o.  at 36w4d here today for obstetrical visit.  Patient is without complaints.    She reports good fetal movement.  She denies vaginal bleeding.  She denies rupture of membranes.  She denies contractions.     has Prenatal care, subsequent pregnancy; Previous  delivery, antepartum condition or complication; and Supervision of other high risk pregnancies, third trimester on her problem list.        A/P IUP at 36w4d  AFP done  1 hour glucola done  Rhogam done  GBS done    F/U in 1 weeks

## 2017-12-27 ENCOUNTER — APPOINTMENT (OUTPATIENT)
Dept: ADMISSIONS | Facility: MEDICAL CENTER | Age: 32
End: 2017-12-27
Attending: OBSTETRICS & GYNECOLOGY
Payer: COMMERCIAL

## 2018-01-02 ENCOUNTER — HOSPITAL ENCOUNTER (OUTPATIENT)
Facility: MEDICAL CENTER | Age: 33
End: 2018-01-03
Attending: OBSTETRICS & GYNECOLOGY | Admitting: OBSTETRICS & GYNECOLOGY
Payer: COMMERCIAL

## 2018-01-02 ENCOUNTER — ROUTINE PRENATAL (OUTPATIENT)
Dept: OBGYN | Facility: CLINIC | Age: 33
End: 2018-01-02
Payer: COMMERCIAL

## 2018-01-02 VITALS — SYSTOLIC BLOOD PRESSURE: 128 MMHG | BODY MASS INDEX: 48.09 KG/M2 | DIASTOLIC BLOOD PRESSURE: 82 MMHG | WEIGHT: 289 LBS

## 2018-01-02 VITALS
HEIGHT: 65 IN | SYSTOLIC BLOOD PRESSURE: 122 MMHG | HEART RATE: 86 BPM | DIASTOLIC BLOOD PRESSURE: 78 MMHG | BODY MASS INDEX: 48.15 KG/M2 | WEIGHT: 289 LBS | TEMPERATURE: 97.4 F | RESPIRATION RATE: 18 BRPM

## 2018-01-02 DIAGNOSIS — O09.893 SUPERVISION OF OTHER HIGH RISK PREGNANCIES, THIRD TRIMESTER: ICD-10-CM

## 2018-01-02 DIAGNOSIS — O34.219 PREVIOUS CESAREAN DELIVERY, ANTEPARTUM CONDITION OR COMPLICATION: ICD-10-CM

## 2018-01-02 PROCEDURE — 90040 PR PRENATAL FOLLOW UP: CPT | Performed by: OBSTETRICS & GYNECOLOGY

## 2018-01-02 PROCEDURE — 81002 URINALYSIS NONAUTO W/O SCOPE: CPT

## 2018-01-02 RX ORDER — ONDANSETRON 2 MG/ML
4 INJECTION INTRAMUSCULAR; INTRAVENOUS ONCE
Status: COMPLETED | OUTPATIENT
Start: 2018-01-03 | End: 2018-01-03

## 2018-01-02 RX ORDER — SODIUM CHLORIDE, SODIUM LACTATE, POTASSIUM CHLORIDE, CALCIUM CHLORIDE 600; 310; 30; 20 MG/100ML; MG/100ML; MG/100ML; MG/100ML
1000 INJECTION, SOLUTION INTRAVENOUS CONTINUOUS
Status: DISCONTINUED | OUTPATIENT
Start: 2018-01-03 | End: 2018-01-03 | Stop reason: HOSPADM

## 2018-01-02 NOTE — PROGRESS NOTES
Chey Guo is a 32 y.o.  at 37w4d here today for obstetrical visit.  Patient is without complaints.    She reports good fetal movement.  She denies vaginal bleeding.  She denies rupture of membranes.  She denies contractions.     has Prenatal care, subsequent pregnancy; Previous  delivery, antepartum condition or complication; and Supervision of other high risk pregnancies, third trimester on her problem list.     section scheduled for  at 9:30 AM    A/P IUP at 37w4d  AFP done  1 hour glucola done  Rhogam done  GBS done    F/U in 1 weeks

## 2018-01-03 PROCEDURE — 96374 THER/PROPH/DIAG INJ IV PUSH: CPT

## 2018-01-03 PROCEDURE — 700105 HCHG RX REV CODE 258: Performed by: OBSTETRICS & GYNECOLOGY

## 2018-01-03 PROCEDURE — 700111 HCHG RX REV CODE 636 W/ 250 OVERRIDE (IP): Performed by: OBSTETRICS & GYNECOLOGY

## 2018-01-03 PROCEDURE — 59025 FETAL NON-STRESS TEST: CPT | Performed by: OBSTETRICS & GYNECOLOGY

## 2018-01-03 RX ADMIN — SODIUM CHLORIDE, POTASSIUM CHLORIDE, SODIUM LACTATE AND CALCIUM CHLORIDE 1000 ML: 600; 310; 30; 20 INJECTION, SOLUTION INTRAVENOUS at 00:06

## 2018-01-03 RX ADMIN — ONDANSETRON 4 MG: 2 INJECTION INTRAMUSCULAR; INTRAVENOUS at 00:05

## 2018-01-03 NOTE — PROGRESS NOTES
"- Pt arrived to L&D as a  with and KATIE of  making her 37w4d. Pt c/o abdominal cramping that started around 1800 after vomiting. Pt has had n/v since 1800 and is unable to keep fluids or food down. -VB, -LOF and +FM reported. PT has a hx of c/s xs1. SVE at Closed/%. UA collected (See results).  0000- Dr Saba updated on pt status. Orders received to start IV, IV hydrate and give zofran (see mar). Once n/v subside orally hydrate and continue to monitor.   010- Pt called out feeling increased intensity in her UCs. Pt orally hydrating and states her n/v has subsided. Dr Saba updated. Orders received to feed pt.   0200- Pt fed. Stated her n/v has subsided and pt states UCs feel \"mild and not as often, and I'm tired.\"   210- Dr Saba updated. Orders received to Discharge pt home with labor precautions.  215- IV removed. Discharge instructions and labor precautions given. Pt signed discharge instructions and ambulated out with FOB.     "

## 2018-01-04 LAB
APPEARANCE UR: CLEAR
COLOR UR AUTO: YELLOW
GLUCOSE UR QL STRIP.AUTO: NEGATIVE MG/DL
KETONES UR QL STRIP.AUTO: 40 MG/DL
LEUKOCYTE ESTERASE UR QL STRIP.AUTO: NEGATIVE
NITRITE UR QL STRIP.AUTO: NEGATIVE
PH UR STRIP.AUTO: 6 [PH]
PROT UR QL STRIP: 100 MG/DL
RBC UR QL AUTO: ABNORMAL
SP GR UR: >=1.03

## 2018-01-10 ENCOUNTER — ROUTINE PRENATAL (OUTPATIENT)
Dept: OBGYN | Facility: CLINIC | Age: 33
End: 2018-01-10
Payer: COMMERCIAL

## 2018-01-10 VITALS — SYSTOLIC BLOOD PRESSURE: 112 MMHG | BODY MASS INDEX: 47.43 KG/M2 | DIASTOLIC BLOOD PRESSURE: 72 MMHG | WEIGHT: 285 LBS

## 2018-01-10 DIAGNOSIS — O09.893 SUPERVISION OF OTHER HIGH RISK PREGNANCIES, THIRD TRIMESTER: ICD-10-CM

## 2018-01-10 DIAGNOSIS — O34.219 PREVIOUS CESAREAN DELIVERY, ANTEPARTUM CONDITION OR COMPLICATION: ICD-10-CM

## 2018-01-10 PROCEDURE — 90040 PR PRENATAL FOLLOW UP: CPT | Performed by: OBSTETRICS & GYNECOLOGY

## 2018-01-11 NOTE — PROGRESS NOTES
Chey Guo is a 32 y.o.  at 38w5d here today for obstetrical visit.  Patient is without complaints.    She reports good fetal movement.  She denies vaginal bleeding.  She denies rupture of membranes.  She denies contractions.     has Prenatal care, subsequent pregnancy; Previous  delivery, antepartum condition or complication; and Supervision of other high risk pregnancies, third trimester on her problem list.    Past medical history is reviewed and updated  Past surgical history is reviewed and updated  Medications reviewed and updated  Allergies reviewed and updated  Social history reviewed and updated  Family history reviewed and updated    Discussed with the patient indications for  delivery. The patient voiced understanding of indications for  section at this time.    Discussed with the patient the risks of  delivery. The risks include infection, bleeding, scarring, damage to other organs in the area of operation. Specifically organs that can be damaged are bowel, bladder, ureters. I also discussed with the patient the risk of wound infection and wound breakdown. We discussed that these risks are greater in people that have diabetes or obesity. I also discussed the risk of emergency blood transfusion during procedure as well as emergency hysterectomy during procedure.    Patient had the opportunity to ask questions regarding procedures. All questions answered to the patient's satisfaction.  Proceed with  delivery     A/P IUP at 38w5d  AFP done  1 hour glucola done  Rhogam done  GBS done    F/U in 1 weeks    Patient was offered NST but was unable to stay due to needing to pick her kids up at 5 PM. Patient will do kick count and kick count is not satisfactory she will follow up on labor and delivery this evening

## 2018-01-12 ENCOUNTER — HOSPITAL ENCOUNTER (INPATIENT)
Facility: MEDICAL CENTER | Age: 33
LOS: 2 days | End: 2018-01-14
Attending: OBSTETRICS & GYNECOLOGY | Admitting: OBSTETRICS & GYNECOLOGY
Payer: COMMERCIAL

## 2018-01-12 DIAGNOSIS — O34.219 PREVIOUS CESAREAN DELIVERY, ANTEPARTUM CONDITION OR COMPLICATION: ICD-10-CM

## 2018-01-12 LAB
BASOPHILS # BLD AUTO: 0.3 % (ref 0–1.8)
BASOPHILS # BLD: 0.03 K/UL (ref 0–0.12)
EOSINOPHIL # BLD AUTO: 0.22 K/UL (ref 0–0.51)
EOSINOPHIL NFR BLD: 2.2 % (ref 0–6.9)
ERYTHROCYTE [DISTWIDTH] IN BLOOD BY AUTOMATED COUNT: 41.1 FL (ref 35.9–50)
ERYTHROCYTE [DISTWIDTH] IN BLOOD BY AUTOMATED COUNT: 41.3 FL (ref 35.9–50)
HCT VFR BLD AUTO: 36.8 % (ref 37–47)
HCT VFR BLD AUTO: 40.3 % (ref 37–47)
HGB BLD-MCNC: 12.2 G/DL (ref 12–16)
HGB BLD-MCNC: 13.5 G/DL (ref 12–16)
HOLDING TUBE BB 8507: NORMAL
IMM GRANULOCYTES # BLD AUTO: 0.07 K/UL (ref 0–0.11)
IMM GRANULOCYTES NFR BLD AUTO: 0.7 % (ref 0–0.9)
LYMPHOCYTES # BLD AUTO: 1.83 K/UL (ref 1–4.8)
LYMPHOCYTES NFR BLD: 18.3 % (ref 22–41)
MCH RBC QN AUTO: 27.4 PG (ref 27–33)
MCH RBC QN AUTO: 27.6 PG (ref 27–33)
MCHC RBC AUTO-ENTMCNC: 33.2 G/DL (ref 33.6–35)
MCHC RBC AUTO-ENTMCNC: 33.5 G/DL (ref 33.6–35)
MCV RBC AUTO: 82.4 FL (ref 81.4–97.8)
MCV RBC AUTO: 82.5 FL (ref 81.4–97.8)
MONOCYTES # BLD AUTO: 0.57 K/UL (ref 0–0.85)
MONOCYTES NFR BLD AUTO: 5.7 % (ref 0–13.4)
NEUTROPHILS # BLD AUTO: 7.29 K/UL (ref 2–7.15)
NEUTROPHILS NFR BLD: 72.8 % (ref 44–72)
NRBC # BLD AUTO: 0 K/UL
NRBC BLD-RTO: 0 /100 WBC
PLATELET # BLD AUTO: 241 K/UL (ref 164–446)
PLATELET # BLD AUTO: 288 K/UL (ref 164–446)
PMV BLD AUTO: 10.7 FL (ref 9–12.9)
PMV BLD AUTO: 11 FL (ref 9–12.9)
RBC # BLD AUTO: 4.46 M/UL (ref 4.2–5.4)
RBC # BLD AUTO: 4.89 M/UL (ref 4.2–5.4)
WBC # BLD AUTO: 10 K/UL (ref 4.8–10.8)
WBC # BLD AUTO: 13.1 K/UL (ref 4.8–10.8)

## 2018-01-12 PROCEDURE — 36415 COLL VENOUS BLD VENIPUNCTURE: CPT

## 2018-01-12 PROCEDURE — 700105 HCHG RX REV CODE 258: Performed by: ANESTHESIOLOGY

## 2018-01-12 PROCEDURE — 85027 COMPLETE CBC AUTOMATED: CPT

## 2018-01-12 PROCEDURE — 306828 HCHG ANES-TIME GENERAL: Performed by: OBSTETRICS & GYNECOLOGY

## 2018-01-12 PROCEDURE — 305385 HCHG SURGICAL SERVICES 1/4 HOUR

## 2018-01-12 PROCEDURE — 0DNW0ZZ RELEASE PERITONEUM, OPEN APPROACH: ICD-10-PCS | Performed by: OBSTETRICS & GYNECOLOGY

## 2018-01-12 PROCEDURE — 500429 HCHG DRESSING, INTERCEED

## 2018-01-12 PROCEDURE — 85025 COMPLETE CBC W/AUTO DIFF WBC: CPT

## 2018-01-12 PROCEDURE — 700111 HCHG RX REV CODE 636 W/ 250 OVERRIDE (IP): Performed by: ANESTHESIOLOGY

## 2018-01-12 PROCEDURE — 700101 HCHG RX REV CODE 250: Mod: JW

## 2018-01-12 PROCEDURE — 700102 HCHG RX REV CODE 250 W/ 637 OVERRIDE(OP): Performed by: ANESTHESIOLOGY

## 2018-01-12 PROCEDURE — 304964 HCHG RECOVERY ROOM TIME 1HR

## 2018-01-12 PROCEDURE — 770002 HCHG ROOM/CARE - OB PRIVATE (112)

## 2018-01-12 PROCEDURE — 59514 CESAREAN DELIVERY ONLY: CPT

## 2018-01-12 PROCEDURE — 700111 HCHG RX REV CODE 636 W/ 250 OVERRIDE (IP)

## 2018-01-12 RX ORDER — SIMETHICONE 80 MG
80 TABLET,CHEWABLE ORAL 4 TIMES DAILY PRN
Status: DISCONTINUED | OUTPATIENT
Start: 2018-01-12 | End: 2018-01-14 | Stop reason: HOSPADM

## 2018-01-12 RX ORDER — DIPHENHYDRAMINE HYDROCHLORIDE 50 MG/ML
25 INJECTION INTRAMUSCULAR; INTRAVENOUS EVERY 6 HOURS PRN
Status: DISCONTINUED | OUTPATIENT
Start: 2018-01-12 | End: 2018-01-13

## 2018-01-12 RX ORDER — LEVOTHYROXINE SODIUM 0.07 MG/1
75 TABLET ORAL
Status: DISCONTINUED | OUTPATIENT
Start: 2018-01-13 | End: 2018-01-14 | Stop reason: HOSPADM

## 2018-01-12 RX ORDER — SODIUM CHLORIDE, SODIUM LACTATE, POTASSIUM CHLORIDE, CALCIUM CHLORIDE 600; 310; 30; 20 MG/100ML; MG/100ML; MG/100ML; MG/100ML
INJECTION, SOLUTION INTRAVENOUS PRN
Status: DISCONTINUED | OUTPATIENT
Start: 2018-01-12 | End: 2018-01-14 | Stop reason: HOSPADM

## 2018-01-12 RX ORDER — SODIUM CHLORIDE, SODIUM LACTATE, POTASSIUM CHLORIDE, CALCIUM CHLORIDE 600; 310; 30; 20 MG/100ML; MG/100ML; MG/100ML; MG/100ML
INJECTION, SOLUTION INTRAVENOUS CONTINUOUS
Status: DISCONTINUED | OUTPATIENT
Start: 2018-01-12 | End: 2018-01-12 | Stop reason: HOSPADM

## 2018-01-12 RX ORDER — METOCLOPRAMIDE HYDROCHLORIDE 5 MG/ML
10 INJECTION INTRAMUSCULAR; INTRAVENOUS ONCE
Status: DISCONTINUED | OUTPATIENT
Start: 2018-01-12 | End: 2018-01-12 | Stop reason: HOSPADM

## 2018-01-12 RX ORDER — ONDANSETRON 2 MG/ML
4 INJECTION INTRAMUSCULAR; INTRAVENOUS EVERY 6 HOURS PRN
Status: DISCONTINUED | OUTPATIENT
Start: 2018-01-12 | End: 2018-01-13

## 2018-01-12 RX ORDER — METHYLERGONOVINE MALEATE 0.2 MG/ML
0.2 INJECTION INTRAVENOUS
Status: DISCONTINUED | OUTPATIENT
Start: 2018-01-12 | End: 2018-01-14 | Stop reason: HOSPADM

## 2018-01-12 RX ORDER — MISOPROSTOL 200 UG/1
600 TABLET ORAL
Status: DISCONTINUED | OUTPATIENT
Start: 2018-01-12 | End: 2018-01-14 | Stop reason: HOSPADM

## 2018-01-12 RX ORDER — OXYCODONE HYDROCHLORIDE AND ACETAMINOPHEN 5; 325 MG/1; MG/1
1 TABLET ORAL EVERY 4 HOURS PRN
Status: DISCONTINUED | OUTPATIENT
Start: 2018-01-12 | End: 2018-01-13

## 2018-01-12 RX ORDER — METOCLOPRAMIDE HYDROCHLORIDE 5 MG/ML
10 INJECTION INTRAMUSCULAR; INTRAVENOUS EVERY 6 HOURS PRN
Status: DISCONTINUED | OUTPATIENT
Start: 2018-01-12 | End: 2018-01-13

## 2018-01-12 RX ORDER — DOCUSATE SODIUM 100 MG/1
100 CAPSULE, LIQUID FILLED ORAL 2 TIMES DAILY PRN
Status: DISCONTINUED | OUTPATIENT
Start: 2018-01-12 | End: 2018-01-14 | Stop reason: HOSPADM

## 2018-01-12 RX ORDER — SODIUM CHLORIDE, SODIUM LACTATE, POTASSIUM CHLORIDE, CALCIUM CHLORIDE 600; 310; 30; 20 MG/100ML; MG/100ML; MG/100ML; MG/100ML
1500 INJECTION, SOLUTION INTRAVENOUS ONCE
Status: COMPLETED | OUTPATIENT
Start: 2018-01-12 | End: 2018-01-12

## 2018-01-12 RX ORDER — CARBOPROST TROMETHAMINE 250 UG/ML
250 INJECTION, SOLUTION INTRAMUSCULAR
Status: DISCONTINUED | OUTPATIENT
Start: 2018-01-12 | End: 2018-01-12 | Stop reason: HOSPADM

## 2018-01-12 RX ORDER — CARBOPROST TROMETHAMINE 250 UG/ML
250 INJECTION, SOLUTION INTRAMUSCULAR
Status: DISCONTINUED | OUTPATIENT
Start: 2018-01-12 | End: 2018-01-14 | Stop reason: HOSPADM

## 2018-01-12 RX ORDER — KETOROLAC TROMETHAMINE 30 MG/ML
30 INJECTION, SOLUTION INTRAMUSCULAR; INTRAVENOUS EVERY 6 HOURS
Status: COMPLETED | OUTPATIENT
Start: 2018-01-12 | End: 2018-01-13

## 2018-01-12 RX ORDER — CITRIC ACID/SODIUM CITRATE 334-500MG
30 SOLUTION, ORAL ORAL ONCE
Status: COMPLETED | OUTPATIENT
Start: 2018-01-12 | End: 2018-01-12

## 2018-01-12 RX ORDER — MISOPROSTOL 200 UG/1
800 TABLET ORAL
Status: DISCONTINUED | OUTPATIENT
Start: 2018-01-12 | End: 2018-01-12 | Stop reason: HOSPADM

## 2018-01-12 RX ORDER — METHYLERGONOVINE MALEATE 0.2 MG/ML
0.2 INJECTION INTRAVENOUS
Status: DISCONTINUED | OUTPATIENT
Start: 2018-01-12 | End: 2018-01-12 | Stop reason: HOSPADM

## 2018-01-12 RX ADMIN — ONDANSETRON 4 MG: 2 INJECTION INTRAMUSCULAR; INTRAVENOUS at 13:29

## 2018-01-12 RX ADMIN — KETOROLAC TROMETHAMINE 30 MG: 30 INJECTION, SOLUTION INTRAMUSCULAR at 13:27

## 2018-01-12 RX ADMIN — SODIUM CHLORIDE, POTASSIUM CHLORIDE, SODIUM LACTATE AND CALCIUM CHLORIDE 2000 ML: 600; 310; 30; 20 INJECTION, SOLUTION INTRAVENOUS at 09:12

## 2018-01-12 RX ADMIN — KETOROLAC TROMETHAMINE 30 MG: 30 INJECTION, SOLUTION INTRAMUSCULAR at 18:34

## 2018-01-12 RX ADMIN — FAMOTIDINE 20 MG: 10 INJECTION, SOLUTION INTRAVENOUS at 09:12

## 2018-01-12 RX ADMIN — SODIUM CITRATE AND CITRIC ACID MONOHYDRATE 30 ML: 500; 334 SOLUTION ORAL at 09:12

## 2018-01-12 ASSESSMENT — LIFESTYLE VARIABLES
DO YOU DRINK ALCOHOL: NO
ALCOHOL_USE: NO
PACK_YEARS: 10
EVER_SMOKED: YES

## 2018-01-12 ASSESSMENT — PAIN SCALES - GENERAL
PAINLEVEL_OUTOF10: 0

## 2018-01-12 NOTE — PROGRESS NOTES
0740 Assumed pt care. Pt stable and alert. FOB at bedside. Will proceed w/ c/s orders and repeat c/s as planned.    1200 bedside report to PP RN, Mary Jo. Pt stable and alert. FOB at bedside; infant wrapped in blankets. Cuddles and lab bands checked. All questions answered.

## 2018-01-12 NOTE — PROGRESS NOTES
Report and pt received into 355.  Pt is AOx3, no movement of lower extremities and transferred to bed with help.  IV and rai patent.  Sequentials on and denies pain at this time.  ID bands checked and verified with Kayleen FLORES, pt, FOB and infant.   Assessment done, security, medication and skylight info discussed.  Pt will call when needs pain meds.  FOB and family at bedside.

## 2018-01-12 NOTE — H&P
History and Physical      Chey Guo is a 32 y.o. female  at 39w0d who presents for repeat c section    Subjective:   negative  For CTXS.   negative Feels pain   negative for LOF  negative for vaginal bleeding.   positive for fetal movement    ROS: A comprehensive review of systems was negative.    Past Medical History:   Diagnosis Date   • Asthma    • Previous  delivery, antepartum condition or complication 2017   • Supervision of other high risk pregnancy, antepartum 2017   • Thyroid disease     hypo     Past Surgical History:   Procedure Laterality Date   • PRIMARY C SECTION           OB History    Para Term  AB Living   2 1 1     1   SAB TAB Ectopic Molar Multiple Live Births             1      # Outcome Date GA Lbr Leonardo/2nd Weight Sex Delivery Anes PTL Lv   2 Current            1 Term 07 42w0d  3.941 kg (8 lb 11 oz) M CS-Unspec Spinal N CARISSA      Complications: Failure to Progress in First Stage        Social History     Social History   • Marital status:      Spouse name: N/A   • Number of children: N/A   • Years of education: N/A     Occupational History   • Not on file.     Social History Main Topics   • Smoking status: Former Smoker     Packs/day: 0.25     Years: 10.00     Types: Cigarettes     Quit date: 2017   • Smokeless tobacco: Never Used   • Alcohol use No   • Drug use: No   • Sexual activity: Yes     Other Topics Concern   • Not on file     Social History Narrative   • No narrative on file     Allergies: Patient has no known allergies.    Current Facility-Administered Medications:   •  LR (BOLUS) infusion 1,500 mL 30 min prior to  section, 1,500 mL, Intravenous, Once, Rustam Youngblood M.D.  •  Sod Citrate-Citric Acid (BICITRA) 500-334 MG/5ML solution 30 mL, 30 mL, Oral, Once, Rustam Youngblood M.D.  •  famotidine (PEPCID) injection 20 mg, 20 mg, Intravenous, Once, Rustam Youngblood M.D.  •  metoclopramide (REGLAN) injection 10 mg, 10 mg,  "Intravenous, Once, Rustam Youngblood M.D.  •  lactated ringers (LR) infusion, , Intravenous, Continuous, Dilcia Jackson M.D.  •  LR infusion, , Intravenous, Continuous, Dilcia Jackson M.D.  •  misoprostol (CYTOTEC) tablet 800 mcg, 800 mcg, Rectal, Once PRN, Dilcia Jackson M.D.  •  methylergonovine (METHERGINE) injection 0.2 mg, 0.2 mg, Intramuscular, Once PRN, Dilcia Jackson M.D.  •  carboPROST (HEMABATE) injection 250 mcg, 250 mcg, Intramuscular, Once PRN, Dilcia Jackson M.D.    Prenatal care with Dr Saba starting at 12 weeks with following problems:  Patient Active Problem List    Diagnosis Date Noted   • Previous  delivery, antepartum condition or complication 2017   • Supervision of other high risk pregnancies, third trimester 2017   • Prenatal care, subsequent pregnancy 2017               Objective:      Temperature 36.7 °C (98.1 °F), height 1.651 m (5' 5\"), weight (!) 131.5 kg (290 lb).    General:   no acute distress   Skin:   normal   HEENT:  Neck supple with midline trachea   Lungs:   CTA bilateral   Heart:   S1, S2 normal, no murmur, click, rub or gallop, regular rate and rhythm, brisk carotid upstroke without bruits, peripheral pulses very brisk, chest is clear without rales or wheezing, no pedal edema, no JVD, no hepatosplenomegaly   Abdomen:   gravid, NT   EFW:  9 lbs   Pelvis:  adequate with gynecoid pelvis   FHT:  130 BPM   Uterine Size: S=D   Presentations: Cephalic   Cervix:     Dilation: Closed    Effacement: 50%    Station:  Floating    Consistency: Medium    Position: Middle     Lab Review  Lab:   Blood type: O     Recent Results (from the past 5880 hour(s))   CBC WITH DIFFERENTIAL    Collection Time: 17  6:39 PM   Result Value Ref Range    WBC 11.5 (H) 4.8 - 10.8 K/uL    RBC 4.93 4.20 - 5.40 M/uL    Hemoglobin 14.1 12.0 - 16.0 g/dL    Hematocrit 41.8 37.0 - 47.0 %    MCV 84.8 81.4 - 97.8 fL    MCH 28.6 27.0 - 33.0 pg    MCHC 33.7 33.6 - 35.0 g/dL "    RDW 41.0 35.9 - 50.0 fL    Platelet Count 292 164 - 446 K/uL    MPV 9.8 9.0 - 12.9 fL    Neutrophils-Polys 69.80 44.00 - 72.00 %    Lymphocytes 22.40 22.00 - 41.00 %    Monocytes 5.80 0.00 - 13.40 %    Eosinophils 1.40 0.00 - 6.90 %    Basophils 0.30 0.00 - 1.80 %    Immature Granulocytes 0.30 0.00 - 0.90 %    Nucleated RBC 0.00 /100 WBC    Neutrophils (Absolute) 8.05 (H) 2.00 - 7.15 K/uL    Lymphs (Absolute) 2.58 1.00 - 4.80 K/uL    Monos (Absolute) 0.67 0.00 - 0.85 K/uL    Eos (Absolute) 0.16 0.00 - 0.51 K/uL    Baso (Absolute) 0.03 0.00 - 0.12 K/uL    Immature Granulocytes (abs) 0.04 0.00 - 0.11 K/uL    NRBC (Absolute) 0.00 K/uL   COMP METABOLIC PANEL    Collection Time: 07/07/17  6:39 PM   Result Value Ref Range    Sodium 133 (L) 135 - 145 mmol/L    Potassium 3.7 3.6 - 5.5 mmol/L    Chloride 102 96 - 112 mmol/L    Co2 21 20 - 33 mmol/L    Anion Gap 10.0 0.0 - 11.9    Glucose 85 65 - 99 mg/dL    Bun 8 8 - 22 mg/dL    Creatinine 0.66 0.50 - 1.40 mg/dL    Calcium 9.0 8.5 - 10.5 mg/dL    AST(SGOT) 13 12 - 45 U/L    ALT(SGPT) 11 2 - 50 U/L    Alkaline Phosphatase 50 30 - 99 U/L    Total Bilirubin 0.2 0.1 - 1.5 mg/dL    Albumin 3.6 3.2 - 4.9 g/dL    Total Protein 7.3 6.0 - 8.2 g/dL    Globulin 3.7 (H) 1.9 - 3.5 g/dL    A-G Ratio 1.0 g/dL   HCG QUANTITATIVE SERUM    Collection Time: 07/07/17  6:39 PM   Result Value Ref Range    Bhcg 95339.0 (H) 0.0 - 5.0 mIU/mL   URINALYSIS CULTURE, IF INDICATED    Collection Time: 07/07/17  6:39 PM   Result Value Ref Range    Color Yellow     Character Clear     Specific Gravity 1.031 <1.035    Ph 6.0 5.0 - 8.0    Glucose Negative Negative mg/dL    Ketones Negative Negative mg/dL    Protein Negative Negative mg/dL    Bilirubin Negative Negative    Nitrite Negative Negative    Leukocyte Esterase Negative Negative    Occult Blood Trace (A) Negative    Micro Urine Req Microscopic     Culture Indicated Yes UA Culture   RH TYPE FOR RHOGAM FROM [facility]    Collection Time: 07/07/17  6:39 PM    Result Value Ref Range    Emergency Department Rh Typing POS     Number Of Rh Doses Indicated ZERO    URINE MICROSCOPIC (W/UA)    Collection Time: 07/07/17  6:39 PM   Result Value Ref Range    WBC 5-10 (A) /hpf    RBC 0-2 /hpf    Bacteria Many (A) None /hpf    Epithelial Cells Moderate (A) /hpf    Ca Oxalate Crystal Few /hpf    Hyaline Cast 6-10 (A) /lpf   ESTIMATED GFR    Collection Time: 07/07/17  6:39 PM   Result Value Ref Range    GFR If African American >60 >60 mL/min/1.73 m 2    GFR If Non African American >60 >60 mL/min/1.73 m 2   URINE CULTURE(NEW)    Collection Time: 07/07/17  6:39 PM   Result Value Ref Range    Significant Indicator NEG     Source UR     Site      Urine Culture Mixed skin kimberly >100,000 cfu/mL    POCT US - In Clinic OB Scan    Collection Time: 08/10/17  9:37 AM   Result Value Ref Range    In Clinic OB Scan     THINPREP RFLX HPV ASCUS W/CTNG    Collection Time: 08/10/17  9:51 AM   Result Value Ref Range    Cytology Reg See Path Report     Source Endo/Cervical     C. trachomatis by PCR Negative Negative    N. gonorrhoeae by PCR Negative Negative   PRENATAL PANEL 3+HIV+UACXI    Collection Time: 08/25/17  3:31 PM   Result Value Ref Range    Color Yellow     Character Cloudy (A)     Specific Gravity 1.028 <1.035    Ph 6.0 5.0 - 8.0    Glucose Negative Negative mg/dL    Ketones Negative Negative mg/dL    Protein Negative Negative mg/dL    Bilirubin Negative Negative    Urobilinogen, Urine 0.2 Negative    Nitrite Negative Negative    Leukocyte Esterase Negative Negative    Occult Blood Negative Negative    Micro Urine Req Microscopic     WBC 10.3 4.8 - 10.8 K/uL    RBC 4.64 4.20 - 5.40 M/uL    Hemoglobin 12.9 12.0 - 16.0 g/dL    Hematocrit 38.8 37.0 - 47.0 %    MCV 83.6 81.4 - 97.8 fL    MCH 27.8 27.0 - 33.0 pg    MCHC 33.2 (L) 33.6 - 35.0 g/dL    RDW 40.6 35.9 - 50.0 fL    Platelet Count 308 164 - 446 K/uL    MPV 10.5 9.0 - 12.9 fL    Neutrophils-Polys 66.90 44.00 - 72.00 %    Lymphocytes  23.30 22.00 - 41.00 %    Monocytes 6.80 0.00 - 13.40 %    Eosinophils 2.20 0.00 - 6.90 %    Basophils 0.40 0.00 - 1.80 %    Immature Granulocytes 0.40 0.00 - 0.90 %    Nucleated RBC 0.00 /100 WBC    Neutrophils (Absolute) 6.86 2.00 - 7.15 K/uL    Lymphs (Absolute) 2.39 1.00 - 4.80 K/uL    Monos (Absolute) 0.70 0.00 - 0.85 K/uL    Eos (Absolute) 0.23 0.00 - 0.51 K/uL    Baso (Absolute) 0.04 0.00 - 0.12 K/uL    Immature Granulocytes (abs) 0.04 0.00 - 0.11 K/uL    NRBC (Absolute) 0.00 K/uL    Culture Indicated Yes UA Culture    Rubella IgG Antibody 33.80 IU/mL    Syphilis, Treponemal Qual Non Reactive Non Reactive    Hepatitis B Surface Antigen Negative Negative   AFP TETRA    Collection Time: 08/25/17  3:31 PM   Result Value Ref Range    AFP Value -Eia 16 ng/mL    AFP MOM Value 0.48     Hcg Value 10,439 IU/L    Hcg Mom 0.71     Ue3 Value 1.26 ng/mL    Ue3 Mom 0.91     Interpretation Normal     M[age]ATIE [age] yr    Gestational Age Based On US     Gestational Age 19.00 weeks    Insulin Dependent Diabetes No     Race Other     Multiple Pregnancy One     Laura Value -Eia 93 pg/mL    Laura Mom Value 0.77     Maternal Weight 272 lbs    Err Maternal Scrn AFP See Note    HIV ANTIBODIES    Collection Time: 08/25/17  3:31 PM   Result Value Ref Range    HIV Ag/Ab Combo Assay Non Reactive Non Reactive   OP PRENATAL PANEL-BLOOD BANK    Collection Time: 08/25/17  3:31 PM   Result Value Ref Range    ABO Grouping Only O     Rh Grouping Only POS     Antibody Screen Scrn NEG    URINE MICROSCOPIC (W/UA)    Collection Time: 08/25/17  3:31 PM   Result Value Ref Range    WBC 20-50 (A) /hpf    Bacteria Many (A) None /hpf    Epithelial Cells Moderate (A) /hpf    Ca Oxalate Crystal Few /hpf   URINE CULTURE(NEW)    Collection Time: 08/25/17  3:31 PM   Result Value Ref Range    Significant Indicator NEG     Source UR     Urine Culture Mixed skin kimberly >100,000 cfu/mL    CBC WITHOUT DIFFERENTIAL    Collection Time: 11/07/17  9:01 AM    Result Value Ref Range    WBC 9.6 4.8 - 10.8 K/uL    RBC 4.51 4.20 - 5.40 M/uL    Hemoglobin 13.0 12.0 - 16.0 g/dL    Hematocrit 38.7 37.0 - 47.0 %    MCV 85.8 81.4 - 97.8 fL    MCH 28.8 27.0 - 33.0 pg    MCHC 33.6 33.6 - 35.0 g/dL    RDW 42.8 35.9 - 50.0 fL    Platelet Count 285 164 - 446 K/uL    MPV 10.6 9.0 - 12.9 fL   GLUCOSE 1HR GESTATIONAL    Collection Time: 17  9:01 AM   Result Value Ref Range    Glucose, Post Dose 83 70 - 139 mg/dL   T.PALLIDUM AB EIA    Collection Time: 17  9:01 AM   Result Value Ref Range    Syphilis, Treponemal Qual Non Reactive Non Reactive   GRP B STREP, BY PCR (BRICE BROTH)    Collection Time: 12/15/17 11:47 AM   Result Value Ref Range    Strep Gp B DNA PCR Negative Negative   POC UA    Collection Time: 18 11:28 PM   Result Value Ref Range    POC Color Yellow     POC Appearance Clear     POC Glucose Negative Negative mg/dL    POC Ketones 40 (A) Negative mg/dL    POC Specific Gravity >=1.030 (A) 1.005 - 1.030    POC Blood Trace-intact (A) Negative    POC Urine PH 6.0 5.0 - 8.0    POC Protein 100 (A) Negative mg/dL    POC Nitrites Negative Negative    POC Leukocyte Esterase Negative Negative        Assessment:   Chey Tosin Guo at 39w0d  Labor status: Not in labor.  Obstetrical history significant for   Patient Active Problem List    Diagnosis Date Noted   • Previous  delivery, antepartum condition or complication 2017   • Supervision of other high risk pregnancies, third trimester 2017   • Prenatal care, subsequent pregnancy 2017   .      Plan:     Admit to L&D  GBS negative    Previous c section needs repeat    Discussed with the patient indications for  delivery. The patient voiced understanding of indications for  section at this time.    Discussed with the patient the risks of  delivery. The risks include infection, bleeding, scarring, damage to other organs in the area of operation. Specifically organs that  can be damaged are bowel, bladder, ureters. I also discussed with the patient the risk of wound infection and wound breakdown. We discussed that these risks are greater in people that have diabetes or obesity. I also discussed the risk of emergency blood transfusion during procedure as well as emergency hysterectomy during procedure.      Patient had the opportunity to ask questions regarding procedures. All questions answered to the patient's satisfaction.  Proceed with  delivery

## 2018-01-12 NOTE — OP REPORT
DATE OF SERVICE:  2018    PREOPERATIVE DIAGNOSES:  Intrauterine pregnancy at term, history of previous    delivery.    POSTOPERATIVE DIAGNOSES:  Intrauterine pregnancy at term, history of previous    delivery.    PROCEDURE PERFORMED:  Repeat low transverse , lysis of adhesions.    SURGEON:  Dilcia Jackson MD    ASSISTANT:  Katelyn Strange MD    ANESTHESIA:  Spinal.    ANESTHESIOLOGIST:  Rustam Youngblood MD    ESTIMATED BLOOD LOSS:  500 mL.    INTRAOPERATIVE FINDINGS:  Female infant, Apgars of 8 and 8 in vertex   presentation.  Normal uterus, tubes, and ovaries with multiple abdominal wall   adhesions and bladder adhesions.    PROCEDURE IN DETAIL:  After informed consent was obtained, the patient was   taken to the operating room where spinal anesthesia was then found to be   adequate.  She was prepped and draped in usual sterile fashion.  A   Pfannenstiel skin incision was made over previous incision, carried down to   the underlying layer of fascia with the knife.  The fascia was then nicked in   the midline, elevated and the fascial incision was extended laterally on both   sides with Rodriguez scissors. Rectus muscles were then dissected off the posterior   aspect of the fascia both superiorly and inferiorly.  They were  in   the midline.  The peritoneum was then entered between 2 hemostats. Upon   entering the peritoneum, there were multiple intraabdominal adhesions, which   were removed using sharp and blunt dissection.  One thicker adhesion was clamped x2 with Danni clamps and then transected and   suture ligated.  Once the adhesions had been removed, a bladder flap was   created.  The uterus was then incised in a low transverse fashion and extended   with bandage scissors.  There was some difficulty of delivering the baby, a   vacuum extractor was applied to the head 2 times.  Finally, the baby was   delivered to the head and then the remainder of the body was delivered  without   difficulty.  The cord was then clamped x2 and cut, and the infant was handed   to awaiting nursing staff.  The placenta was then delivered manually. The   uterus was exteriorized, cleared of all clots and debris with a moist lap   sponge and was closed with #1 chromic in a running locked fashion.  At that   point, there were a few areas where the adhesions had been lysed that were   bleeding.  These were oversewn with a chromic stitch.  The uterus then   returned to the peritoneal cavity.  The abdomen was irrigated and cleared of   all clots and debris with a moist lap sponge.  Interceed was placed over the   uterine incision and along the fascial incision.  The peritoneum was then   closed with 0 Vicryl, muscles were reapproximated with a Vicryl stitch.  The   fascia was closed with a 0 Vicryl in a running fashion.  The subcutaneous   layers were then checked for hemostasis, which was noted to be adequate,   reapproximated with a 2-0 plain and the skin was closed in a subcuticular   fashion and then a Prineo dressing was applied.  The sponge, lap, and needle   counts were all correct x2.  The patient was taken to the recovery room in   good condition.       ____________________________________     MD PATI HAYDEN / JODI    DD:  01/12/2018 11:05:50  DT:  01/12/2018 11:18:33    D#:  [id number]  Job#:  [id number]

## 2018-01-12 NOTE — OR SURGEON
Immediate Post OP Note    PreOp Diagnosis: IUP at term, previous c section    PostOp Diagnosis: same    Procedure(s):  REPEAT C SECTION - Wound Class: Clean Contaminated    Surgeon(s):  SERGEY Palma    Anesthesiologist/Type of Anesthesia:  Youngblood/Spinal    Surgical Staff:  * No surgical staff found *    Specimens:  * No specimens in log *    Estimated Blood Loss: 500cc    Findings: female presentation Vertex APGAR 8/8  Uterus Normal, Tubes Normal, ovaries Normal  Multiple abd adhesions      Complications: none        2018 10:54 AM Dilcia Jackson

## 2018-01-13 PROCEDURE — A9270 NON-COVERED ITEM OR SERVICE: HCPCS | Performed by: OBSTETRICS & GYNECOLOGY

## 2018-01-13 PROCEDURE — A9270 NON-COVERED ITEM OR SERVICE: HCPCS | Performed by: ANESTHESIOLOGY

## 2018-01-13 PROCEDURE — 700102 HCHG RX REV CODE 250 W/ 637 OVERRIDE(OP): Performed by: ANESTHESIOLOGY

## 2018-01-13 PROCEDURE — 700112 HCHG RX REV CODE 229: Performed by: OBSTETRICS & GYNECOLOGY

## 2018-01-13 PROCEDURE — 700102 HCHG RX REV CODE 250 W/ 637 OVERRIDE(OP): Performed by: OBSTETRICS & GYNECOLOGY

## 2018-01-13 PROCEDURE — 770002 HCHG ROOM/CARE - OB PRIVATE (112)

## 2018-01-13 PROCEDURE — 700111 HCHG RX REV CODE 636 W/ 250 OVERRIDE (IP): Performed by: ANESTHESIOLOGY

## 2018-01-13 RX ORDER — IBUPROFEN 800 MG/1
800 TABLET ORAL EVERY 8 HOURS PRN
Status: DISCONTINUED | OUTPATIENT
Start: 2018-01-13 | End: 2018-01-14 | Stop reason: HOSPADM

## 2018-01-13 RX ORDER — ONDANSETRON 2 MG/ML
4 INJECTION INTRAMUSCULAR; INTRAVENOUS EVERY 6 HOURS PRN
Status: DISCONTINUED | OUTPATIENT
Start: 2018-01-13 | End: 2018-01-14 | Stop reason: HOSPADM

## 2018-01-13 RX ORDER — ONDANSETRON 4 MG/1
4 TABLET, ORALLY DISINTEGRATING ORAL EVERY 6 HOURS PRN
Status: DISCONTINUED | OUTPATIENT
Start: 2018-01-13 | End: 2018-01-14 | Stop reason: HOSPADM

## 2018-01-13 RX ORDER — OXYCODONE HYDROCHLORIDE 10 MG/1
10 TABLET ORAL EVERY 4 HOURS PRN
Status: DISCONTINUED | OUTPATIENT
Start: 2018-01-13 | End: 2018-01-14 | Stop reason: HOSPADM

## 2018-01-13 RX ORDER — OXYCODONE HYDROCHLORIDE 5 MG/1
5 TABLET ORAL EVERY 4 HOURS PRN
Status: DISCONTINUED | OUTPATIENT
Start: 2018-01-13 | End: 2018-01-14 | Stop reason: HOSPADM

## 2018-01-13 RX ORDER — MORPHINE SULFATE 4 MG/ML
4 INJECTION, SOLUTION INTRAMUSCULAR; INTRAVENOUS
Status: DISCONTINUED | OUTPATIENT
Start: 2018-01-13 | End: 2018-01-14 | Stop reason: HOSPADM

## 2018-01-13 RX ADMIN — OXYCODONE HYDROCHLORIDE AND ACETAMINOPHEN 1 TABLET: 5; 325 TABLET ORAL at 06:50

## 2018-01-13 RX ADMIN — IBUPROFEN 800 MG: 800 TABLET, FILM COATED ORAL at 14:06

## 2018-01-13 RX ADMIN — IBUPROFEN 800 MG: 800 TABLET, FILM COATED ORAL at 23:43

## 2018-01-13 RX ADMIN — KETOROLAC TROMETHAMINE 30 MG: 30 INJECTION, SOLUTION INTRAMUSCULAR at 01:11

## 2018-01-13 RX ADMIN — OXYCODONE HYDROCHLORIDE 10 MG: 10 TABLET ORAL at 23:43

## 2018-01-13 RX ADMIN — OXYCODONE HYDROCHLORIDE AND ACETAMINOPHEN 1 TABLET: 5; 325 TABLET ORAL at 11:32

## 2018-01-13 RX ADMIN — SIMETHICONE CHEW TAB 80 MG 80 MG: 80 TABLET ORAL at 11:34

## 2018-01-13 RX ADMIN — KETOROLAC TROMETHAMINE 30 MG: 30 INJECTION, SOLUTION INTRAMUSCULAR at 06:44

## 2018-01-13 RX ADMIN — DOCUSATE SODIUM 100 MG: 100 CAPSULE ORAL at 11:34

## 2018-01-13 RX ADMIN — LEVOTHYROXINE SODIUM 75 MCG: 75 TABLET ORAL at 06:44

## 2018-01-13 RX ADMIN — OXYCODONE HYDROCHLORIDE AND ACETAMINOPHEN 1 TABLET: 5; 325 TABLET ORAL at 01:52

## 2018-01-13 RX ADMIN — OXYCODONE HYDROCHLORIDE 5 MG: 5 TABLET ORAL at 15:33

## 2018-01-13 RX ADMIN — OXYCODONE HYDROCHLORIDE 5 MG: 5 TABLET ORAL at 19:46

## 2018-01-13 ASSESSMENT — PAIN SCALES - GENERAL
PAINLEVEL_OUTOF10: 4
PAINLEVEL_OUTOF10: 0
PAINLEVEL_OUTOF10: 7
PAINLEVEL_OUTOF10: 4
PAINLEVEL_OUTOF10: 4
PAINLEVEL_OUTOF10: 0

## 2018-01-13 NOTE — PROGRESS NOTES
Post Partum Progress Note    Name:   Chey Guo   Date/Time:  2018 - 5:39 AM  Chief Admitting Dx:  Pregnancy  Repeat   Indication for care in labor or delivery  Delivery Type:   for repeat  Post-Op/Post Partum Days #:  1    Subjective:  Abdominal pain: no  Ambulating:   yes  Tolerating liquids:  yes  Tolerating food:  yes common adult  Flatus:   yes  BM:    no  Bleeding:   without any bleeding  Voiding:   yes  Dizziness:   no  Feeding:   breast    Vitals:    18 0000 18 0100 18 0200 18 0400   BP: 108/60   110/71   Pulse: 77 82 79 85   Resp:    Temp: 37 °C (98.6 °F)   36.7 °C (98 °F)   SpO2: 98% 95% 96% 96%   Weight:       Height:           Exam:  Breast: Tenderness no  Abdomen: Abdomen soft, non-tender. BS normal. No masses,  No organomegaly  Fundal Tenderness:  no  Fundus Firm: yes  Incision: dry and intact  Below umbilicus: no  Perineum: perineum intact  Lochia: mild  Extremities: Normal extremities, peripheral pulses and reflexes normal    Meds:  Current Facility-Administered Medications   Medication Dose   • oxytocin (PITOCIN) infusion (for postpartum)  2,000 mL/hr    Followed by   • oxytocin (PITOCIN) infusion (for postpartum)   mL/hr   • LR infusion     • PRN oxytocin (PITOCIN) (20 Units/1000 mL) PRN for excessive uterine bleeding - See Admin Instr  125-999 mL/hr   • misoprostol (CYTOTEC) tablet 600 mcg  600 mcg   • methylergonovine (METHERGINE) injection 0.2 mg  0.2 mg   • carboPROST (HEMABATE) injection 250 mcg  250 mcg   • docusate sodium (COLACE) capsule 100 mg  100 mg   • simethicone (MYLICON) chewable tab 80 mg  80 mg   • ketorolac (TORADOL) injection 30 mg  30 mg   • oxycodone-acetaminophen (PERCOCET) 5-325 MG per tablet 1 Tab  1 Tab   • ephedrine injection 10 mg  10 mg   • ondansetron (ZOFRAN) syringe/vial injection 4 mg  4 mg   • metoclopramide (REGLAN) injection 10 mg  10 mg   • naloxone (NARCAN) 0.4 mg in NS 1,000 mL infusion   0.4 mg   • diphenhydrAMINE (BENADRYL) injection 25 mg  25 mg   • levothyroxine (SYNTHROID) tablet 75 mcg  75 mcg       Labs:   Recent Labs      18   0815  18   1854   WBC  10.0  13.1*   RBC  4.89  4.46   HEMOGLOBIN  13.5  12.2   HEMATOCRIT  40.3  36.8*   MCV  82.4  82.5   MCH  27.6  27.4   MCHC  33.5*  33.2*   RDW  41.1  41.3   PLATELETCT  288  241   MPV  11.0  10.7       Assessment:  Chief Admitting Dx:  Pregnancy  Repeat   Indication for care in labor or delivery  Delivery Type: Repeat C/S  Tubal Ligation:  no    Plan:  Continue routine post partum care.  Advance care, encourage ambulation and breastfeeding    LUCI Lind.

## 2018-01-13 NOTE — PROGRESS NOTES
Received bedside report from MARK Quiros. Discussed plan of care. Patient will call for pain medication as needed. All needs met at this time.

## 2018-01-13 NOTE — PROGRESS NOTES
Assessment done. Pt.denies need for pain medication.Incision approximated with sutures and tegaderm without drainage, swelling or redness.Lochia scant to light.patient demonstrated use of incentive spirometer.patient states breastfeeding going well. Pt ambulating in room without dizziness or assistance.Pt. Passing flatus, no problems urinating.patient has received education on skylight usage.plan of care for today discussed.

## 2018-01-13 NOTE — CONSULTS
"Mother reports baby has been latching well, denies pain with latch. Mother has [facility]HP through [facility], baby not in room at this time. Turned on breastfeeding edu video's, watching hunger cues & getting baby to open wide for deep latch. \"Breastfeeding Essentials\" provided with review (1:1 lac consults). Teaching on importance of skin to skin, breastfeed on demand or by 3 hours of last feed, normal course of milk supply and when to expect milk to come in. Breastfeeding plan, breastfeed on demand every 2-3 hours.  "

## 2018-01-14 VITALS
TEMPERATURE: 98.5 F | WEIGHT: 290 LBS | DIASTOLIC BLOOD PRESSURE: 58 MMHG | RESPIRATION RATE: 18 BRPM | SYSTOLIC BLOOD PRESSURE: 105 MMHG | HEART RATE: 78 BPM | BODY MASS INDEX: 48.32 KG/M2 | HEIGHT: 65 IN | OXYGEN SATURATION: 95 %

## 2018-01-14 PROCEDURE — 302131 K PAD MOTOR: Performed by: OBSTETRICS & GYNECOLOGY

## 2018-01-14 PROCEDURE — 302151 K-PAD 3X20: Performed by: OBSTETRICS & GYNECOLOGY

## 2018-01-14 PROCEDURE — A9270 NON-COVERED ITEM OR SERVICE: HCPCS | Performed by: OBSTETRICS & GYNECOLOGY

## 2018-01-14 PROCEDURE — 700102 HCHG RX REV CODE 250 W/ 637 OVERRIDE(OP): Performed by: OBSTETRICS & GYNECOLOGY

## 2018-01-14 RX ORDER — OXYCODONE HYDROCHLORIDE AND ACETAMINOPHEN 5; 325 MG/1; MG/1
1-2 TABLET ORAL EVERY 4 HOURS PRN
Qty: 30 TAB | Refills: 0 | Status: SHIPPED | OUTPATIENT
Start: 2018-01-14 | End: 2018-01-21

## 2018-01-14 RX ORDER — IBUPROFEN 800 MG/1
800 TABLET ORAL EVERY 8 HOURS PRN
Qty: 30 TAB | Refills: 0 | Status: SHIPPED | OUTPATIENT
Start: 2018-01-14 | End: 2018-02-27

## 2018-01-14 RX ORDER — PSEUDOEPHEDRINE HCL 30 MG
100 TABLET ORAL 2 TIMES DAILY PRN
Qty: 60 CAP | Refills: 1 | Status: SHIPPED | OUTPATIENT
Start: 2018-01-14 | End: 2018-02-27

## 2018-01-14 RX ADMIN — SIMETHICONE CHEW TAB 80 MG 80 MG: 80 TABLET ORAL at 03:00

## 2018-01-14 RX ADMIN — LEVOTHYROXINE SODIUM 75 MCG: 75 TABLET ORAL at 07:09

## 2018-01-14 RX ADMIN — IBUPROFEN 800 MG: 800 TABLET, FILM COATED ORAL at 07:11

## 2018-01-14 ASSESSMENT — PAIN SCALES - GENERAL: PAINLEVEL_OUTOF10: 1

## 2018-01-14 NOTE — CARE PLAN
Problem: Altered physiologic condition related to postoperative  delivery  Goal: Patient physiologically stable as evidenced by normal lochia, palpable uterine involution and vital signs within normal limits  Outcome: PROGRESSING AS EXPECTED  Patient is stable. Lochia light. Fundus firm. Vital signs are within defined limits.

## 2018-01-14 NOTE — DISCHARGE INSTRUCTIONS
POSTPARTUM DISCHARGE INSTRUCTIONS FOR MOM    YOB: 1985   Age: 32 y.o.               Admit Date: 2018     Discharge Date: 2018  Attending Doctor:  Dilcia Jackson M.D.                  Allergies:  Patient has no known allergies.    Discharged to home by car. Discharged via wheelchair, hospital escort: Yes.  Special equipment needed: Not Applicable  Belongings with: Personal  Be sure to schedule a follow-up appointment with your primary care doctor or any specialists as instructed.     Discharge Plan:   Diet Plan: Discussed  Activity Level: Discussed  Smoking Cessation Offered: Patient Refused  Confirmed Follow up Appointment: Patient to Call and Schedule Appointment  Confirmed Symptoms Management: Discussed  Influenza Vaccine Indication: Patient Refuses    REASONS TO CALL YOUR OBSTETRICIAN:  1.   Persistent fever or shaking chills (Temperature higher than 100.4)  2.   Heavy bleeding (soaking more than 1 pad per hour); Passing clots  3.   Foul odor from vagina  4.   Mastitis (Breast infection; breast pain, chills, fever, redness)  5.   Urinary pain, burning or frequency  6.   Episiotomy infection  7.   Abdominal incision infection  8.   Severe depression longer than 24 hours    HAND WASHING  · Prior to handling the baby.  · Before breastfeeding or bottle feeding baby.  · After using the bathroom or changing the baby's diaper.    WOUND CARE  Ask your physician for additional care instructions.  In general:    ·  Incision:      · Keep clean and dry.    · Do NOT lift anything heavier than your baby for up to 6 weeks.    · There should not be any opening or pus.      VAGINAL CARE  · Nothing inside vagina for 6 weeks: no sexual intercourse, tampons or douching.  · Bleeding may continue for 2-4 weeks.  Amount may vary.    · Call your physician for heavy bleeding which means soaking more than 1 pad per hour    BIRTH CONTROL  · It is possible to become pregnant at any time after delivery and  "while breastfeeding.  · Plan to discuss a method of birth control with your physician at your follow up visit. visit.    DIET AND ELIMINATION  · Eating more fiber (bran cereal, fruits, and vegetables) and drinking plenty of fluids will help to avoid constipation.  · Urinary frequency after childbirth is normal.    POSTPARTUM BLUES  During the first few days after birth, you may experience a sense of the \"blues\" which may include impatience, irritability or even crying.  These feeling come and go quickly.  However, as many as 1 in 10 women experience emotional symptoms known as postpartum depression.    Postpartum depression:  May start as early as the second or third day after delivery or take several weeks or months to develop.  Symptoms of \"blues\" are present, but are more intense:  Crying spells; loss of appetite; feelings of hopelessness or loss of control; fear of touching the baby; over concern or no concern at all about the baby; little or no concern about your own appearance/caring for yourself; and/or inability to sleep or excessive sleeping.  Contact your physician if you are experiencing any of these symptoms.    Crisis Hotline:  · [place] Crisis Hotline:  0-381-QYDZHFX  Or 1-840.983.5537  · [place] Crisis Hotline:  1-651.654.4173  Or 239-700-7425    PREVENTING SHAKEN BABY:  If you are angry or stressed, PUT THE BABY IN THE CRIB, step away, take some deep breaths, and wait until you are calm to care for the baby.  DO NOT SHAKE THE BABY.  You are not alone, call a supporter for help.    · Crisis Call Center 24/7 crisis line 477-914-0345 or 1-797.379.1824  · You can also text them, text \"ANSWER\" to 737139    QUIT SMOKING/TOBACCO USE:  I understand the use of any tobacco products increases my chance of suffering from future heart disease and could cause other illnesses which may shorten my life. Quitting the use of tobacco products is the single most important thing I can do to improve my health. For further " information on smoking / tobacco cessation call a Toll Free Quit Line at 1-218.590.3623 (*National Cancer [place]) or 1-601.210.6847 (American Lung Association) or you can access the web based program at www.lungusa.org.    · [place] Tobacco Users Help Line:  (665) 748-5662       Toll Free: 1-209.128.3350  · Quit Tobacco Program [facility] (031)938-4486    DEPRESSION / SUICIDE RISK:  As you are discharged from this [facility], it is important to learn how to keep safe from harming yourself.    Recognize the warning signs:  · Abrupt changes in personality, positive or negative- including increase in energy   · Giving away possessions  · Change in eating patterns- significant weight changes-  positive or negative  · Change in sleeping patterns- unable to sleep or sleeping all the time   · Unwillingness or inability to communicate  · Depression  · Unusual sadness, discouragement and loneliness  · Talk of wanting to die  · Neglect of personal appearance   · Rebelliousness- reckless behavior  · Withdrawal from people/activities they love  · Confusion- inability to concentrate     If you or a loved one observes any of these behaviors or has concerns about self-harm, here's what you can do:  · Talk about it- your feelings and reasons for harming yourself  · Remove any means that you might use to hurt yourself (examples: pills, rope, extension cords, firearm)  · Get professional help from the community (Mental Health, Substance Abuse, psychological counseling)  · Do not be alone:Call your Safe Contact- someone whom you trust who will be there for you.  · Call your local CRISIS HOTLINE 876-7356 or 809-139-8258  · Call your local Children's Mobile Crisis Response Team [place] (273) 332-0723 or www.PureWRX  · Call the toll free National Suicide Prevention Hotlines   · National Suicide Prevention Lifeline 760-366-UWFJ (5163)  · National Hope Line Network 800-SUICIDE  (661-8791)    DISCHARGE SURVEY:  Thank you for choosing [facility].  We hope we provided you with very good care.  You may be receiving a survey in the mail.  Please fill it out.  Your opinion is valuable to us.    ADDITIONAL EDUCATIONAL MATERIALS GIVEN TO PATIENT:        My signature on this form indicates that:  1.  I have reviewed and understand the above information  2.  My questions regarding this information have been answered to my satisfaction.  3.  I have formulated a plan with my discharge nurse to obtain my prescribed medication for home.

## 2018-01-14 NOTE — CARE PLAN
Problem: Potential knowledge deficit related to lack of understanding of self and  care  Goal: Patient will demonstrate ability to care for self and infant  Outcome: PROGRESSING AS EXPECTED  Patient demonstrates skills in baby care and self care. Patient able to feed,change diapers and comfort baby. Patient demonstrates good hygiene for self and baby.     Problem: Potential anxiety related to difficulty adapting to parental role  Goal: Patient will verbalize and demonstrate effective bonding and parenting behavior  Outcome: PROGRESSING AS EXPECTED  Patient bonding well with baby.holding and comforting baby when needed. Patient seeking information on baby care and has supportive family or friend to rely on .patient aware of parenting support agencies such as [facility] for infant care assistance and information.

## 2018-01-14 NOTE — DISCHARGE SUMMARY
Discharge Summary:      Chey Guo      Admit Date:   2018  Discharge Date:  2018     Admitting diagnosis:  Pregnancy  Repeat   Indication for care in labor or delivery  Discharge Diagnosis: Status post  for repeat.  Pregnancy Complications: none  Tubal Ligation:  no        History:  Past Medical History:   Diagnosis Date   • Asthma    • Previous  delivery, antepartum condition or complication 2017   • Supervision of other high risk pregnancy, antepartum 2017   • Thyroid disease     hypo     OB History    Para Term  AB Living   2 1 1     1   SAB TAB Ectopic Molar Multiple Live Births             1      # Outcome Date GA Lbr Leonardo/2nd Weight Sex Delivery Anes PTL Lv   2 Current            1 Term 07 42w0d  3.941 kg (8 lb 11 oz) M CS-Unspec Spinal N CARISSA      Complications: Failure to Progress in First Stage           Patient has no known allergies.  Patient Active Problem List    Diagnosis Date Noted   • Previous  delivery, antepartum condition or complication 2017   • Supervision of other high risk pregnancies, third trimester 2017   • Prenatal care, subsequent pregnancy 2017        Hospital Course:   32 y.o. , now para 2, was admitted with the above mentioned diagnosis, underwent Repeat  repeat,  for repeat. Patient postpartum course was unremarkable, with progressive advancement in diet , ambulation and toleration of oral analgesia. Patient without complaints today and desires discharge.      Vitals:    18 0100 18 0200 18 0400 18   BP:   110/71 121/77   Pulse: 82 79 85 82   Resp:    Temp:   36.7 °C (98 °F) 37.1 °C (98.8 °F)   SpO2: 95% 96% 96% 98%   Weight:       Height:           Current Facility-Administered Medications   Medication Dose   • ibuprofen (MOTRIN) tablet 800 mg  800 mg   • oxycodone immediate-release (ROXICODONE) tablet 5 mg  5 mg   •  oxycodone immediate release (ROXICODONE) tablet 10 mg  10 mg   • morphine (pf) 4 mg/ml injection 4 mg  4 mg   • ondansetron (ZOFRAN) syringe/vial injection 4 mg  4 mg    Or   • ondansetron (ZOFRAN ODT) dispertab 4 mg  4 mg   • oxytocin (PITOCIN) infusion (for postpartum)   mL/hr   • LR infusion     • PRN oxytocin (PITOCIN) (20 Units/1000 mL) PRN for excessive uterine bleeding - See Admin Instr  125-999 mL/hr   • misoprostol (CYTOTEC) tablet 600 mcg  600 mcg   • methylergonovine (METHERGINE) injection 0.2 mg  0.2 mg   • carboPROST (HEMABATE) injection 250 mcg  250 mcg   • docusate sodium (COLACE) capsule 100 mg  100 mg   • simethicone (MYLICON) chewable tab 80 mg  80 mg   • levothyroxine (SYNTHROID) tablet 75 mcg  75 mcg       Exam:  Breast Exam: negative  Abdomen: Abdomen soft, non-tender. BS normal. No masses,  No organomegaly  Fundus Non Tender: yes  Incision: no evidence of infection, separation or keloid formation.  Perineum: perineum intact  Extremity: extremities, peripheral pulses and reflexes normal     Labs:  Recent Labs      01/12/18   0815  01/12/18   1854   WBC  10.0  13.1*   RBC  4.89  4.46   HEMOGLOBIN  13.5  12.2   HEMATOCRIT  40.3  36.8*   MCV  82.4  82.5   MCH  27.6  27.4   MCHC  33.5*  33.2*   RDW  41.1  41.3   PLATELETCT  288  241   MPV  11.0  10.7        Activity:   Discharge to home  Pelvic Rest x 6 weeks    Assessment:  normal postpartum course  Discharge Assessment: No areas of skin breakdown/redness; surgical incision intact/healing     Follow up: .C or [facility] in 5 weeks for vaginal ; 1 week for incision check.      Discharge Meds:   Current Outpatient Prescriptions   Medication Sig Dispense Refill   • ibuprofen (MOTRIN) 800 MG Tab Take 1 Tab by mouth every 8 hours as needed (For cramping after delivery; do not give if patient is receiving ketorolac (Toradol)). 30 Tab 0   • docusate sodium 100 MG Cap Take 100 mg by mouth 2 times a day as needed for Constipation. 60 Cap  1   • oxycodone-acetaminophen (PERCOCET) 5-325 MG Tab Take 1-2 Tabs by mouth every four hours as needed for up to 7 days. 30 Tab 0       SILVIA Garcia.

## 2018-01-14 NOTE — PROGRESS NOTES
Received bedside report from Kizzy Shaw RN. Discussed plan of care. Patient would like pain medication as it becomes available. All needs met at this time.

## 2018-01-26 ENCOUNTER — POST PARTUM (OUTPATIENT)
Dept: OBGYN | Facility: CLINIC | Age: 33
End: 2018-01-26
Payer: COMMERCIAL

## 2018-01-26 VITALS
SYSTOLIC BLOOD PRESSURE: 122 MMHG | DIASTOLIC BLOOD PRESSURE: 82 MMHG | BODY MASS INDEX: 43.32 KG/M2 | HEIGHT: 65 IN | WEIGHT: 260 LBS

## 2018-01-26 DIAGNOSIS — O34.219 PREVIOUS CESAREAN DELIVERY, ANTEPARTUM CONDITION OR COMPLICATION: ICD-10-CM

## 2018-01-26 DIAGNOSIS — O09.893 SUPERVISION OF OTHER HIGH RISK PREGNANCIES, THIRD TRIMESTER: ICD-10-CM

## 2018-01-26 PROCEDURE — 99024 POSTOP FOLLOW-UP VISIT: CPT | Performed by: OBSTETRICS & GYNECOLOGY

## 2018-01-26 RX ORDER — NYSTATIN AND TRIAMCINOLONE ACETONIDE 100000; 1 [USP'U]/G; MG/G
OINTMENT TOPICAL
Qty: 1 TUBE | Refills: 0 | Status: SHIPPED | OUTPATIENT
Start: 2018-01-26 | End: 2018-02-27

## 2018-01-26 NOTE — PROGRESS NOTES
"Chey Guo Is a 32 y.o.  status post  delivery on 18. Patient is here today for incision check. Currently the patient is without complaints.  She reports Normal  BM.  Normal  appetite without nausea and vomiting. She denies fever. Pain is under good control.    /82   Ht 1.651 m (5' 5\")   Wt 117.9 kg (260 lb)   Breastfeeding? Yes   BMI 43.27 kg/m²   ABD Abdomen soft, non-tender. BS normal. No masses or organomegaly  Incision healing normally, dry and intact      Assessment and plan  Status post  delivery  No complications incision healing well  Followup in 4 weeks for final postpartum checkup    "

## 2018-02-21 ENCOUNTER — POST PARTUM (OUTPATIENT)
Dept: OBGYN | Facility: CLINIC | Age: 33
End: 2018-02-21
Payer: COMMERCIAL

## 2018-02-21 VITALS — DIASTOLIC BLOOD PRESSURE: 72 MMHG | SYSTOLIC BLOOD PRESSURE: 118 MMHG | WEIGHT: 258 LBS | BODY MASS INDEX: 42.93 KG/M2

## 2018-02-21 PROCEDURE — 90050 PR POSTPARTUM VISIT: CPT | Performed by: OBSTETRICS & GYNECOLOGY

## 2018-02-21 RX ORDER — ACETAMINOPHEN AND CODEINE PHOSPHATE 120; 12 MG/5ML; MG/5ML
1 SOLUTION ORAL DAILY
Qty: 28 TAB | Refills: 12 | Status: SHIPPED | OUTPATIENT
Start: 2018-02-21 | End: 2018-02-27

## 2018-02-21 NOTE — PROGRESS NOTES
Chey Guo is a 32 y.o.  s/p  for repeat on 18. Patient is here today for her postpartum exam. Currently patient is without complaints at this time. Patient is breast-feeding. Patient has not had menses yet. The patient desires BCPmicronor    Well-developed well-nourished female in no apparent distress  Heart regular rate and rhythm  Lungs clear to auscultation bilaterally  Breasts exam and nontender not engorged no dominant masses  Extremities show no clubbing cyanosis or edema    Normal external female genitalia  Well-healed from delivery  Cervix is normal  Uterus approximately 8-10 weeks in size nontender  Adnexa bilaterally normal with no dominant masses    Status post  for repeat  Doing well without complaints  micronor is prescribed  Followup in 1 year for annual exam

## 2018-02-27 ENCOUNTER — OFFICE VISIT (OUTPATIENT)
Dept: URGENT CARE | Facility: PHYSICIAN GROUP | Age: 33
End: 2018-02-27
Payer: COMMERCIAL

## 2018-02-27 ENCOUNTER — HOSPITAL ENCOUNTER (OUTPATIENT)
Facility: MEDICAL CENTER | Age: 33
End: 2018-02-27
Attending: PHYSICIAN ASSISTANT
Payer: COMMERCIAL

## 2018-02-27 VITALS
BODY MASS INDEX: 44.48 KG/M2 | TEMPERATURE: 100.2 F | DIASTOLIC BLOOD PRESSURE: 64 MMHG | HEART RATE: 70 BPM | SYSTOLIC BLOOD PRESSURE: 100 MMHG | WEIGHT: 267 LBS | HEIGHT: 65 IN | OXYGEN SATURATION: 99 %

## 2018-02-27 DIAGNOSIS — N61.1 ABSCESS OF FEMALE BREAST: ICD-10-CM

## 2018-02-27 DIAGNOSIS — N61.0 MASTITIS IN FEMALE: ICD-10-CM

## 2018-02-27 PROCEDURE — 87070 CULTURE OTHR SPECIMN AEROBIC: CPT

## 2018-02-27 PROCEDURE — 87077 CULTURE AEROBIC IDENTIFY: CPT

## 2018-02-27 PROCEDURE — 99203 OFFICE O/P NEW LOW 30 MIN: CPT | Mod: 25 | Performed by: PHYSICIAN ASSISTANT

## 2018-02-27 PROCEDURE — 87186 SC STD MICRODIL/AGAR DIL: CPT

## 2018-02-27 PROCEDURE — 10061 I&D ABSCESS COMP/MULTIPLE: CPT | Mod: 59 | Performed by: PHYSICIAN ASSISTANT

## 2018-02-27 PROCEDURE — 87205 SMEAR GRAM STAIN: CPT

## 2018-02-27 RX ORDER — CEFTRIAXONE 1 G/1
1 INJECTION, POWDER, FOR SOLUTION INTRAMUSCULAR; INTRAVENOUS ONCE
Status: COMPLETED | OUTPATIENT
Start: 2018-02-27 | End: 2018-02-27

## 2018-02-27 RX ORDER — CEPHALEXIN 500 MG/1
500 CAPSULE ORAL 4 TIMES DAILY
Qty: 40 CAP | Refills: 0 | Status: SHIPPED | OUTPATIENT
Start: 2018-02-27 | End: 2018-03-09

## 2018-02-27 RX ADMIN — CEFTRIAXONE 1 G: 1 INJECTION, POWDER, FOR SOLUTION INTRAMUSCULAR; INTRAVENOUS at 20:09

## 2018-02-27 NOTE — LETTER
February 27, 2018         Patient: Chey Guo   YOB: 1985   Date of Visit: 2/27/2018           To Whom it May Concern:    Chey Guo was seen in my clinic on 2/27/2018. She is to be off work tomorrow to recover.     If you have any questions or concerns, please don't hesitate to call.        Sincerely,           Trixie Rees P.A.-C.  Electronically Signed

## 2018-02-28 DIAGNOSIS — N61.1 ABSCESS OF FEMALE BREAST: ICD-10-CM

## 2018-02-28 LAB
GRAM STN SPEC: NORMAL
SIGNIFICANT IND 70042: NORMAL
SITE SITE: NORMAL
SOURCE SOURCE: NORMAL

## 2018-02-28 ASSESSMENT — ENCOUNTER SYMPTOMS
NEUROLOGICAL NEGATIVE: 1
RESPIRATORY NEGATIVE: 1
MYALGIAS: 0
CARDIOVASCULAR NEGATIVE: 1
NAUSEA: 0
VOMITING: 0
ROS SKIN COMMENTS: SEE HPI
CONSTITUTIONAL NEGATIVE: 1

## 2018-03-01 ENCOUNTER — OFFICE VISIT (OUTPATIENT)
Dept: URGENT CARE | Facility: PHYSICIAN GROUP | Age: 33
End: 2018-03-01
Payer: COMMERCIAL

## 2018-03-01 VITALS
DIASTOLIC BLOOD PRESSURE: 76 MMHG | RESPIRATION RATE: 14 BRPM | TEMPERATURE: 98.7 F | OXYGEN SATURATION: 98 % | SYSTOLIC BLOOD PRESSURE: 122 MMHG | HEART RATE: 73 BPM | HEIGHT: 65 IN

## 2018-03-01 DIAGNOSIS — L02.213 CUTANEOUS ABSCESS OF CHEST WALL: ICD-10-CM

## 2018-03-01 DIAGNOSIS — Z48.00 DRESSING CHANGE: ICD-10-CM

## 2018-03-01 ASSESSMENT — ENCOUNTER SYMPTOMS
MYALGIAS: 0
HEADACHES: 0
VOMITING: 0
NAUSEA: 0
CHILLS: 0
FEVER: 0

## 2018-03-02 LAB
BACTERIA WND AEROBE CULT: ABNORMAL
BACTERIA WND AEROBE CULT: ABNORMAL
GRAM STN SPEC: ABNORMAL
SIGNIFICANT IND 70042: ABNORMAL
SITE SITE: ABNORMAL
SOURCE SOURCE: ABNORMAL

## 2018-03-02 NOTE — PROGRESS NOTES
"Subjective:      Chey Guo is a 32 y.o. female who presents with Wound Check            Medications, Allergies and Prior Medical Hx reviewed and updated in [facility].with patient/family today           Wound Check   She was originally treated 2 to 3 days ago. Previous treatment included I&D of abscess. The maximum temperature noted was less than 100.4 F. There has been colored discharge from the wound. The redness has improved. The swelling has improved. The pain has improved. She has no difficulty moving the affected extremity or digit.       Review of Systems   Constitutional: Negative for chills and fever.   Gastrointestinal: Negative for nausea and vomiting.   Musculoskeletal: Negative for myalgias.   Neurological: Negative for headaches.          Objective:     /76   Pulse 73   Temp 37.1 °C (98.7 °F)   Resp 14   Ht 1.651 m (5' 5\")   SpO2 98%      Physical Exam   Constitutional: She appears well-developed and well-nourished. No distress.   HENT:   Head: Normocephalic and atraumatic.   Eyes: Conjunctivae are normal. Pupils are equal, round, and reactive to light.   Neck: Neck supple.   Cardiovascular: Normal rate.    Pulmonary/Chest: Effort normal. No respiratory distress.   Neurological: She is alert.   Awake, alert, answering questions appropriately, moving all extremeties   Skin: Skin is warm and dry. Capillary refill takes less than 2 seconds. No rash noted.   Psychiatric: She has a normal mood and affect. Her behavior is normal.   Vitals reviewed.              Assessment/Plan:     1. Cutaneous abscess of chest wall     2. Dressing change         Procedure Note:   Packing removed w/o difficulty moderate amt purulent drainage  Packed with 1/4 gauze Dressing with non stick dressing  Pt tolerated well     Pt will go to the ER for worsening or changing symptoms as discussed, increased redness, swelling, fevers or chills, or other concerns.   Follow-up with your primary care provider or return " here in 2 days for recheck  Discharge instructions discussed with pt/family who verbalize understanding and agreement with poc    Continue medications as directed previously, I left to discuss use of keflex in breast feeding with pharmacy pt was gone upon my return

## 2018-03-03 ENCOUNTER — OFFICE VISIT (OUTPATIENT)
Dept: URGENT CARE | Facility: PHYSICIAN GROUP | Age: 33
End: 2018-03-03
Payer: COMMERCIAL

## 2018-03-03 VITALS
SYSTOLIC BLOOD PRESSURE: 116 MMHG | BODY MASS INDEX: 44.48 KG/M2 | HEART RATE: 74 BPM | TEMPERATURE: 98.7 F | RESPIRATION RATE: 16 BRPM | OXYGEN SATURATION: 94 % | HEIGHT: 65 IN | DIASTOLIC BLOOD PRESSURE: 72 MMHG | WEIGHT: 267 LBS

## 2018-03-03 DIAGNOSIS — Z51.89 ENCOUNTER FOR WOUND RE-CHECK: ICD-10-CM

## 2018-03-03 DIAGNOSIS — L02.213 CUTANEOUS ABSCESS OF CHEST WALL: ICD-10-CM

## 2018-03-03 RX ORDER — ACETAMINOPHEN 325 MG/1
650 TABLET ORAL EVERY 4 HOURS PRN
COMMUNITY

## 2018-03-03 ASSESSMENT — ENCOUNTER SYMPTOMS
WEAKNESS: 0
CHILLS: 0
FEVER: 0

## 2018-03-03 NOTE — PROGRESS NOTES
"Subjective:      Chey Guo is a 32 y.o. female who presents with Wound Check (Wound check, 3rd visit.  PT states there's not as much pain)            HPI  Marielle is here for wound check. States changing bandage daily. Some purulent d/c with bandage change. Cleaning around site with rubbing alcohol. Minimal pain but will use Tylenol prn.  Review of Systems   Constitutional: Negative for chills, fever and malaise/fatigue.   Neurological: Negative for weakness.   All other systems reviewed and are negative.         Objective:     /72   Pulse 74   Temp 37.1 °C (98.7 °F)   Resp 16   Ht 1.651 m (5' 5\")   Wt 121.1 kg (267 lb)   SpO2 94%   BMI 44.43 kg/m²      Physical Exam   Constitutional: She appears well-developed and well-nourished.   Skin:   Pulled out packing material- purulent d/c. Flushed with 2 saline bullets, repacked and covered with bandage, patient tolerated well.   Vitals reviewed.              Assessment/Plan:       1. Encounter for wound re-check    2. Cutaneous abscess of chest wall    Recheck in 2 days for packing change      "

## 2018-03-05 ENCOUNTER — OFFICE VISIT (OUTPATIENT)
Dept: URGENT CARE | Facility: PHYSICIAN GROUP | Age: 33
End: 2018-03-05
Payer: COMMERCIAL

## 2018-03-05 VITALS
OXYGEN SATURATION: 96 % | HEIGHT: 65 IN | DIASTOLIC BLOOD PRESSURE: 74 MMHG | BODY MASS INDEX: 44.48 KG/M2 | WEIGHT: 267 LBS | TEMPERATURE: 98.5 F | HEART RATE: 66 BPM | SYSTOLIC BLOOD PRESSURE: 116 MMHG

## 2018-03-05 DIAGNOSIS — Z51.89 ENCOUNTER FOR WOUND RE-CHECK: ICD-10-CM

## 2018-03-05 DIAGNOSIS — L02.213 CUTANEOUS ABSCESS OF CHEST WALL: ICD-10-CM

## 2018-03-05 PROCEDURE — 99024 POSTOP FOLLOW-UP VISIT: CPT | Performed by: NURSE PRACTITIONER

## 2018-03-07 ASSESSMENT — ENCOUNTER SYMPTOMS
CHILLS: 0
WEAKNESS: 0
FEVER: 0

## 2018-03-07 NOTE — PROGRESS NOTES
"Subjective:      Chey Guo is a 32 y.o. female who presents with Wound Check (abscess on Rt breast )            HPI  Chey is here for wound recheck from abscess on right breast. 4th encounter. Taking abx still. Daily bandage changes at home. Currently breast feeding. States minimal d/c on packing material.    PMH:  has a past medical history of Asthma; Previous  delivery, antepartum condition or complication (2017); Supervision of other high risk pregnancy, antepartum (2017); and Thyroid disease.  MEDS:   Current Outpatient Prescriptions:   •  cephALEXin (KEFLEX) 500 MG Cap, Take 1 Cap by mouth 4 times a day for 10 days., Disp: 40 Cap, Rfl: 0  •  Prenatal Multivit-Min-Fe-FA (PRENATAL VITAMINS PO), Take  by mouth., Disp: , Rfl:   •  acetaminophen (TYLENOL) 325 MG Tab, Take 650 mg by mouth every four hours as needed., Disp: , Rfl:   •  LEVOTHYROXINE SODIUM PO, Take  by mouth every morning., Disp: , Rfl:   ALLERGIES: No Known Allergies  SURGHX:   Past Surgical History:   Procedure Laterality Date   • REPEAT C SECTION Bilateral 2018    Procedure: REPEAT C SECTION;  Surgeon: Dilcia Jackson M.D.;  Location: LABOR AND DELIVERY;  Service: Labor and Delivery   • PRIMARY C SECTION           SOCHX:  reports that she quit smoking about 11 months ago. Her smoking use included Cigarettes. She has a 2.50 pack-year smoking history. She has never used smokeless tobacco. She reports that she does not drink alcohol or use drugs.  FH: Family history was reviewed, no pertinent findings to report    Review of Systems   Constitutional: Negative for chills, fever and malaise/fatigue.   Neurological: Negative for weakness.   All other systems reviewed and are negative.         Objective:     /74   Pulse 66   Temp 36.9 °C (98.5 °F)   Ht 1.651 m (5' 5\")   Wt 121.1 kg (267 lb)   SpO2 96%   BMI 44.43 kg/m²      Physical Exam   Constitutional: She is oriented to person, place, and time. " She appears well-developed and well-nourished. No distress.   HENT:   Head: Normocephalic.   Pulmonary/Chest: She exhibits no tenderness, no edema and no swelling.   Musculoskeletal: Normal range of motion.   Neurological: She is alert and oriented to person, place, and time.   Skin: Skin is warm and dry. She is not diaphoretic. No erythema.   Bandage has minimal purulent drainage. No purulent d/c from opening. Saline flushing shows no purulent material. Will place packing and loose gauze dressing. May remove packing in shower tomorrow and leavewound open, may cover during day to prevent an drainge on clothing until opening closed, may apply TAB ointment to area until closed.   Vitals reviewed.              Assessment/Plan:   1. Cutaneous abscess of chest wall    2. Encounter for wound re-check    Monitor for fever, any discharge, increase in redness, swelling or size of abscess site- need re-evaluation  Do not pick at or open lesion  May use cool compress at site prn for discomfort and decrease swelling  May use Ibuprofen as anti-inflammatory and discomfort    Continue antibiotic until finished  Keep area clean, wash around site and any drainage around site with mild soap and water  Keep covered with bandage to prevent dirt or debris into wounds and to capture any discharge  Monitor for worsening skin infection with increased swelling, redness, fever, pain- recheck

## 2018-08-30 ENCOUNTER — GYNECOLOGY VISIT (OUTPATIENT)
Dept: OBGYN | Facility: MEDICAL CENTER | Age: 33
End: 2018-08-30
Payer: COMMERCIAL

## 2018-08-30 ENCOUNTER — HOSPITAL ENCOUNTER (OUTPATIENT)
Facility: MEDICAL CENTER | Age: 33
End: 2018-08-30
Attending: OBSTETRICS & GYNECOLOGY
Payer: COMMERCIAL

## 2018-08-30 VITALS
BODY MASS INDEX: 46.2 KG/M2 | WEIGHT: 277.3 LBS | SYSTOLIC BLOOD PRESSURE: 132 MMHG | DIASTOLIC BLOOD PRESSURE: 88 MMHG | HEIGHT: 65 IN

## 2018-08-30 DIAGNOSIS — E03.8 OTHER SPECIFIED HYPOTHYROIDISM: ICD-10-CM

## 2018-08-30 DIAGNOSIS — Z34.82 ENCOUNTER FOR SUPERVISION OF OTHER NORMAL PREGNANCY IN SECOND TRIMESTER: ICD-10-CM

## 2018-08-30 DIAGNOSIS — E03.9 HYPOTHYROIDISM, UNSPECIFIED TYPE: ICD-10-CM

## 2018-08-30 PROCEDURE — 76805 OB US >/= 14 WKS SNGL FETUS: CPT | Mod: 26 | Performed by: OBSTETRICS & GYNECOLOGY

## 2018-08-30 PROCEDURE — 87591 N.GONORRHOEAE DNA AMP PROB: CPT

## 2018-08-30 PROCEDURE — 87491 CHLMYD TRACH DNA AMP PROBE: CPT

## 2018-08-30 PROCEDURE — 59401 PR NEW OB VISIT: CPT | Performed by: OBSTETRICS & GYNECOLOGY

## 2018-08-30 NOTE — PROGRESS NOTES
NOV    CC: NOB visit    HPI: 33 y.o.  with pregnancy, unsure dates.    Pt is s/p c/s in January of this year and had been breastfeeding.  1 period in March and no others, didn't take UPT until 3wks ago and was +.    Reports feeling well.     Unexpected but reports no thoughts of pregnancy termination.    ROS:  gen: feels well, denies concerns  abd: denies pain, negative nausea, negative vomiting  : denies vaginal bleeding, discharge, pain, no FM    OB History    Para Term  AB Living   3 2 2     2   SAB TAB Ectopic Molar Multiple Live Births             2      # Outcome Date GA Lbr Leonardo/2nd Weight Sex Delivery Anes PTL Lv   3 Current            2 Term 18 39w0d  3.42 kg (7 lb 8.6 oz) F Vag-Spont   CARISSA   1 Term 07 42w0d  3.941 kg (8 lb 11 oz) M CS-Unspec Spinal N CARISSA      Complications: Failure to Progress in First Stage          GYNHx:  No STIs  Last pap     Past Medical History:   Diagnosis Date   • Asthma    • Previous  delivery, antepartum condition or complication 2017   • Supervision of other high risk pregnancy, antepartum 2017   • Thyroid disease     hypo     Past Surgical History:   Procedure Laterality Date   • REPEAT C SECTION Bilateral 2018    Procedure: REPEAT C SECTION;  Surgeon: Dilcia Jackson M.D.;  Location: LABOR AND DELIVERY;  Service: Labor and Delivery   • PRIMARY C SECTION           Current Outpatient Prescriptions on File Prior to Visit   Medication Sig Dispense Refill   • LEVOTHYROXINE SODIUM PO Take  by mouth every morning.     • Prenatal Multivit-Min-Fe-FA (PRENATAL VITAMINS PO) Take  by mouth.     • acetaminophen (TYLENOL) 325 MG Tab Take 650 mg by mouth every four hours as needed.       No current facility-administered medications on file prior to visit.    doesn't remember dose of synthroid    Allergies: Patient has no known allergies.    History reviewed. No pertinent family history.  Social History   Substance Use Topics  "  • Smoking status: Former Smoker     Packs/day: 0.25     Years: 10.00     Types: Cigarettes     Quit date: 4/1/2017   • Smokeless tobacco: Never Used   • Alcohol use No       PE:   /88   Ht 1.651 m (5' 5\")   Wt (!) 125.8 kg (277 lb 4.8 oz)   LMP 03/21/2018   BMI 46.15 kg/m²   gen; AAO, NAD, affect appropriate  abd: soft, NT, ND, no organomegaly  Ext: NT, edema  : normal external female genitalia, normal vagina and cervix  Skin: dry, intact, no lesions  Pelvic exam was chaperoned by MA (DV).    OB US performed and per my read:  Indication: pregnancy of unknown gestational age    Transabdominal U/S  Single intrauterine pregnancy, transverse presentation, +FM  BPD 3.51cm (16w6d)  HC 12.87cm (16w4d)  AC 11.35cm (17w1d)  FL 2.19cm (16w4d)  Placenta anterior  Positive fetal cardiac activity, 151 BPM.  Grossly adequate fluid      Right and left ovary not visualized, no masses noted.  No free fluid in the cul-de-sac.    Impression:   single live intrauterine pregnancy @ 16w5d weeks.   EDC by US of 2/8/19      A/P: 33 y.o. @ 16w5d by US today w/ c/s x2, hyopthyroidism  - prior c/s x2, for repeat and strongly desires BTL  - TSH today with PNL; pt doesn't remember her dose of synthroid, sees endocrine, but is taking  - pt desires aneuploidy screening - QS rx given  - anatomy US rx given for 20wks  - pt asked about risks to pregnancy given recent c/s. Discussed since she is having repeat c/s in this pregnancy, primarily has increased risk of PTD with short interpregnancy interval  - offered genetic carrier screening, CF and SMA, pt unsure at this time, will consider and let us know if desires      RTC 4 wks    Dorothea Galan MD  [facility], [facility]      "

## 2018-09-01 LAB
C TRACH DNA SPEC QL NAA+PROBE: NEGATIVE
N GONORRHOEA DNA SPEC QL NAA+PROBE: NEGATIVE
SPECIMEN SOURCE: NORMAL

## 2018-09-06 ENCOUNTER — TELEPHONE (OUTPATIENT)
Dept: OBGYN | Facility: CLINIC | Age: 33
End: 2018-09-06

## 2018-09-06 NOTE — TELEPHONE ENCOUNTER
Called patient, negative gonorrhea and chlamydia results given to patient. Patient had no further questions or concerns.

## 2018-09-06 NOTE — TELEPHONE ENCOUNTER
----- Message from Dorothea Galan M.D. sent at 9/3/2018  2:03 PM PDT -----  MA: please let pt know her gonorrhea and chlamydia are negative

## 2018-09-18 ENCOUNTER — HOSPITAL ENCOUNTER (OUTPATIENT)
Dept: RADIOLOGY | Facility: MEDICAL CENTER | Age: 33
End: 2018-09-18
Attending: OBSTETRICS & GYNECOLOGY
Payer: COMMERCIAL

## 2018-09-18 ENCOUNTER — HOSPITAL ENCOUNTER (OUTPATIENT)
Dept: LAB | Facility: MEDICAL CENTER | Age: 33
End: 2018-09-18
Attending: OBSTETRICS & GYNECOLOGY
Payer: COMMERCIAL

## 2018-09-18 DIAGNOSIS — Z34.82 ENCOUNTER FOR SUPERVISION OF OTHER NORMAL PREGNANCY IN SECOND TRIMESTER: ICD-10-CM

## 2018-09-18 DIAGNOSIS — Z3A.18 18 WEEKS GESTATION OF PREGNANCY: ICD-10-CM

## 2018-09-18 DIAGNOSIS — E03.9 HYPOTHYROIDISM, UNSPECIFIED TYPE: ICD-10-CM

## 2018-09-18 PROBLEM — O34.219 PREVIOUS CESAREAN DELIVERY, ANTEPARTUM CONDITION OR COMPLICATION: Status: RESOLVED | Noted: 2017-11-08 | Resolved: 2018-09-18

## 2018-09-18 PROBLEM — O09.893 SUPERVISION OF OTHER HIGH RISK PREGNANCIES, THIRD TRIMESTER: Status: RESOLVED | Noted: 2017-11-08 | Resolved: 2018-09-18

## 2018-09-18 PROBLEM — Z34.80 PRENATAL CARE, SUBSEQUENT PREGNANCY: Status: RESOLVED | Noted: 2017-08-24 | Resolved: 2018-09-18

## 2018-09-18 PROBLEM — Z98.891 HISTORY OF CESAREAN DELIVERY: Status: ACTIVE | Noted: 2018-09-18

## 2018-09-18 LAB
ABO GROUP BLD: NORMAL
APPEARANCE UR: CLEAR
BASOPHILS # BLD AUTO: 0.3 % (ref 0–1.8)
BASOPHILS # BLD: 0.03 K/UL (ref 0–0.12)
BILIRUB UR QL STRIP.AUTO: NEGATIVE
BLD GP AB SCN SERPL QL: NORMAL
COLOR UR: YELLOW
EOSINOPHIL # BLD AUTO: 0.2 K/UL (ref 0–0.51)
EOSINOPHIL NFR BLD: 2 % (ref 0–6.9)
ERYTHROCYTE [DISTWIDTH] IN BLOOD BY AUTOMATED COUNT: 45.1 FL (ref 35.9–50)
GLUCOSE UR STRIP.AUTO-MCNC: NEGATIVE MG/DL
HBV SURFACE AG SER QL: NEGATIVE
HCT VFR BLD AUTO: 43.2 % (ref 37–47)
HGB BLD-MCNC: 13.8 G/DL (ref 12–16)
HIV 1+2 AB+HIV1 P24 AG SERPL QL IA: NON REACTIVE
IMM GRANULOCYTES # BLD AUTO: 0.07 K/UL (ref 0–0.11)
IMM GRANULOCYTES NFR BLD AUTO: 0.7 % (ref 0–0.9)
KETONES UR STRIP.AUTO-MCNC: NEGATIVE MG/DL
LEUKOCYTE ESTERASE UR QL STRIP.AUTO: NEGATIVE
LYMPHOCYTES # BLD AUTO: 1.64 K/UL (ref 1–4.8)
LYMPHOCYTES NFR BLD: 16.4 % (ref 22–41)
MCH RBC QN AUTO: 28.2 PG (ref 27–33)
MCHC RBC AUTO-ENTMCNC: 31.9 G/DL (ref 33.6–35)
MCV RBC AUTO: 88.2 FL (ref 81.4–97.8)
MICRO URNS: ABNORMAL
MONOCYTES # BLD AUTO: 0.49 K/UL (ref 0–0.85)
MONOCYTES NFR BLD AUTO: 4.9 % (ref 0–13.4)
NEUTROPHILS # BLD AUTO: 7.57 K/UL (ref 2–7.15)
NEUTROPHILS NFR BLD: 75.7 % (ref 44–72)
NITRITE UR QL STRIP.AUTO: NEGATIVE
NRBC # BLD AUTO: 0 K/UL
NRBC BLD-RTO: 0 /100 WBC
PH UR STRIP.AUTO: 7 [PH]
PLATELET # BLD AUTO: 285 K/UL (ref 164–446)
PMV BLD AUTO: 10.7 FL (ref 9–12.9)
PROT UR QL STRIP: NEGATIVE MG/DL
RBC # BLD AUTO: 4.9 M/UL (ref 4.2–5.4)
RBC UR QL AUTO: NEGATIVE
RH BLD: NORMAL
RUBV AB SER QL: 35.4 IU/ML
SP GR UR STRIP.AUTO: 1.01
TREPONEMA PALLIDUM IGG+IGM AB [PRESENCE] IN SERUM OR PLASMA BY IMMUNOASSAY: NON REACTIVE
TSH SERPL DL<=0.005 MIU/L-ACNC: 1.56 UIU/ML (ref 0.38–5.33)
UROBILINOGEN UR STRIP.AUTO-MCNC: 0.2 MG/DL
WBC # BLD AUTO: 10 K/UL (ref 4.8–10.8)

## 2018-09-18 PROCEDURE — 86762 RUBELLA ANTIBODY: CPT

## 2018-09-18 PROCEDURE — 87389 HIV-1 AG W/HIV-1&-2 AB AG IA: CPT

## 2018-09-18 PROCEDURE — 36415 COLL VENOUS BLD VENIPUNCTURE: CPT

## 2018-09-18 PROCEDURE — 84443 ASSAY THYROID STIM HORMONE: CPT

## 2018-09-18 PROCEDURE — 86850 RBC ANTIBODY SCREEN: CPT

## 2018-09-18 PROCEDURE — 86900 BLOOD TYPING SEROLOGIC ABO: CPT

## 2018-09-18 PROCEDURE — 81003 URINALYSIS AUTO W/O SCOPE: CPT

## 2018-09-18 PROCEDURE — 85025 COMPLETE CBC W/AUTO DIFF WBC: CPT

## 2018-09-18 PROCEDURE — 76805 OB US >/= 14 WKS SNGL FETUS: CPT

## 2018-09-18 PROCEDURE — 86901 BLOOD TYPING SEROLOGIC RH(D): CPT

## 2018-09-18 PROCEDURE — 87340 HEPATITIS B SURFACE AG IA: CPT

## 2018-09-18 PROCEDURE — 81511 FTL CGEN ABNOR FOUR ANAL: CPT

## 2018-09-18 PROCEDURE — 86780 TREPONEMA PALLIDUM: CPT

## 2018-09-20 LAB
# FETUSES US: NORMAL
AFP MOM SERPL: 0.76
AFP SERPL-MCNC: 27 NG/ML
AGE - REPORTED: 32.7 YR
CURRENT SMOKER: NO
FAMILY MEMBER DISEASES HX: NO
GA METHOD: NORMAL
GA: NORMAL WK
HCG MOM SERPL: 0.72
HCG SERPL-ACNC: NORMAL IU/L
HX OF HEREDITARY DISORDERS: NO
IDDM PATIENT QL: NO
INHIBIN A MOM SERPL: 0.82
INHIBIN A SERPL-MCNC: 104 PG/ML
INTEGRATED SCN PATIENT-IMP: NORMAL
PATHOLOGY STUDY: NORMAL
SPECIMEN DRAWN SERPL: NORMAL
U ESTRIOL MOM SERPL: 0.62
U ESTRIOL SERPL-MCNC: 0.97 NG/ML

## 2018-09-27 ENCOUNTER — APPOINTMENT (OUTPATIENT)
Dept: OBGYN | Facility: MEDICAL CENTER | Age: 33
End: 2018-09-27
Payer: COMMERCIAL

## 2018-09-28 ENCOUNTER — INITIAL PRENATAL (OUTPATIENT)
Dept: OBGYN | Facility: MEDICAL CENTER | Age: 33
End: 2018-09-28
Payer: COMMERCIAL

## 2018-09-28 VITALS — BODY MASS INDEX: 46.41 KG/M2 | SYSTOLIC BLOOD PRESSURE: 116 MMHG | DIASTOLIC BLOOD PRESSURE: 72 MMHG | WEIGHT: 278.9 LBS

## 2018-09-28 DIAGNOSIS — E03.8 OTHER SPECIFIED HYPOTHYROIDISM: ICD-10-CM

## 2018-09-28 DIAGNOSIS — Z98.891 HISTORY OF CESAREAN DELIVERY: ICD-10-CM

## 2018-09-28 DIAGNOSIS — Z3A.18 18 WEEKS GESTATION OF PREGNANCY: ICD-10-CM

## 2018-09-28 PROCEDURE — 90040 PR PRENATAL FOLLOW UP: CPT | Performed by: OBSTETRICS & GYNECOLOGY

## 2018-09-28 NOTE — PROGRESS NOTES
Patient here today for OB follow up @ 60tuo1d, patient states good FM, denies VB and LOF.  Patient states she is doing well  Pharm verified

## 2018-09-28 NOTE — PROGRESS NOTES
33 y.o.  21w0d The patient is here for routine obstetrical followup. She reports good fetal movement. She denies contractions, vaginal bleeding, or leaking of fluid.    The patient's pregnancy is complicated by   Patient Active Problem List    Diagnosis Date Noted   • 18 weeks gestation of pregnancy 2018   • History of  delivery 2018   • Other specified hypothyroidism 2018     Prenatal labs, TSH, and fetal survey reviewed with patient - normal  Repeat US scheduled to complete fetal survey.     Followup in 4 weeks   labor precautions were discussed with patient  Fetal kick counts were discussed with patient

## 2018-10-02 ENCOUNTER — HOSPITAL ENCOUNTER (OUTPATIENT)
Dept: RADIOLOGY | Facility: MEDICAL CENTER | Age: 33
End: 2018-10-02
Attending: OBSTETRICS & GYNECOLOGY
Payer: COMMERCIAL

## 2018-10-02 DIAGNOSIS — Z3A.18 18 WEEKS GESTATION OF PREGNANCY: ICD-10-CM

## 2018-10-02 PROCEDURE — 76815 OB US LIMITED FETUS(S): CPT

## 2018-10-08 DIAGNOSIS — Z3A.24 24 WEEKS GESTATION OF PREGNANCY: ICD-10-CM

## 2018-10-23 ENCOUNTER — ROUTINE PRENATAL (OUTPATIENT)
Dept: OBGYN | Facility: MEDICAL CENTER | Age: 33
End: 2018-10-23
Payer: COMMERCIAL

## 2018-10-23 VITALS — SYSTOLIC BLOOD PRESSURE: 122 MMHG | WEIGHT: 282 LBS | BODY MASS INDEX: 46.93 KG/M2 | DIASTOLIC BLOOD PRESSURE: 80 MMHG

## 2018-10-23 DIAGNOSIS — Z3A.24 24 WEEKS GESTATION OF PREGNANCY: ICD-10-CM

## 2018-10-23 PROCEDURE — 90040 PR PRENATAL FOLLOW UP: CPT | Performed by: OBSTETRICS & GYNECOLOGY

## 2018-10-23 NOTE — PROGRESS NOTES
33 y.o.  24w4d The patient is here for routine obstetrical followup. She reports good fetal movement. She denies contractions, vaginal bleeding, or leaking of fluid.    The patient's pregnancy is complicated by   Patient Active Problem List    Diagnosis Date Noted   • 18 weeks gestation of pregnancy 2018   • History of  delivery 2018   • Other specified hypothyroidism 2018   Fetal survey reviewed - limited visualization of outflow tracts x 2 - declines perinatology referral.  28 wk labs ordered    Followup in 4 weeks   labor precautions were discussed with patient  Fetal kick counts were discussed with patient

## 2018-10-23 NOTE — PROGRESS NOTES
Ob visit @ 24 w4d. Doing well. Denies bleeding or leaking fluid Active FM. Sciatic nerve pain,advise for therapy's given.Order for 1hr gtt, cbc and t.pall given.

## 2018-11-20 ENCOUNTER — ROUTINE PRENATAL (OUTPATIENT)
Dept: OBGYN | Facility: MEDICAL CENTER | Age: 33
End: 2018-11-20
Payer: COMMERCIAL

## 2018-11-20 VITALS — SYSTOLIC BLOOD PRESSURE: 125 MMHG | DIASTOLIC BLOOD PRESSURE: 72 MMHG | BODY MASS INDEX: 48.26 KG/M2 | WEIGHT: 290 LBS

## 2018-11-20 DIAGNOSIS — E03.8 OTHER SPECIFIED HYPOTHYROIDISM: ICD-10-CM

## 2018-11-20 DIAGNOSIS — O09.93 SUPERVISION OF HIGH RISK PREGNANCY IN THIRD TRIMESTER: ICD-10-CM

## 2018-11-20 PROCEDURE — 90715 TDAP VACCINE 7 YRS/> IM: CPT | Performed by: OBSTETRICS & GYNECOLOGY

## 2018-11-20 PROCEDURE — 90040 PR PRENATAL FOLLOW UP: CPT | Performed by: OBSTETRICS & GYNECOLOGY

## 2018-11-20 PROCEDURE — 90471 IMMUNIZATION ADMIN: CPT | Performed by: OBSTETRICS & GYNECOLOGY

## 2018-11-20 NOTE — LETTER
"Count Your Baby's Movements  Another step to a healthy delivery                 Dept: [phone]    How Many Weeks Pregnant? 28week    Date to Begin Countin weeks              How to use this chart    One way for your physician to keep track of your baby's health is by knowing how often the baby moves (or \"kicks\") in your womb.  You can help your physician to do this by using this chart every day.    Every day, you should see how many hours it takes for your baby to move 10 times.  Start in the morning, as soon as you get up.    · First, write down the time your baby moves until you get to 10.  · Check off one box every time your baby moves until you get to 10.  · Write down the time you finished counting in the last column.  · Total how long it took to count up all 10 movements.  · Finally, fill in the box that shows how long this took.  After counting 10 movements, you no longer have to count any more that day.  The next morning, just start counting again as soon as you get up.    What should you call a \"movement\"?  It is hard to say, because it will feel different from one mother to another and from one pregnancy to the next.  The important thing is that you count the movements the same way throughout your pregnancy.  If you have more questions, you should ask your physician.    Count carefully every day!  SAMPLE:  Week 28    How many hours did it take to feel 10 movements?       Start  Time     1     2     3     4     5     6     7     8     9     10   Finish Time   Mon 8:20 ·  ·  ·  ·  ·  ·  ·  ·  ·  ·  11:40                  Sat               Sun                 IMPORTANT: You should contact your physician if it takes more than two hours for you to feel 10 movements.  Each morning, write down the time and start to count the movements of your baby.  Keep track by checking off one box every time you feel one movement.  When you have felt 10 \"kicks\", " write down the time you finished counting in the last column.  Then fill in the   box (over the check emmy) for the number of hours it took.  Be sure to read the complete instructions on the previous page.

## 2018-11-20 NOTE — PROGRESS NOTES
33 y.o.  28w4d The patient is here for routine obstetrical followup. She reports good fetal movement. She denies contractions, vaginal bleeding, or leaking of fluid.    The patient's pregnancy is complicated by   Patient Active Problem List    Diagnosis Date Noted   • 18 weeks gestation of pregnancy 2018   • History of  delivery 2018   • Other specified hypothyroidism 2018   Declines flu shot  Tdap today  28 wk labs, TSH ordered    Followup in 2 weeks   labor precautions were discussed with patient  Fetal kick counts were discussed with patient

## 2018-11-20 NOTE — PROGRESS NOTES
Ob visit @ 29h3veio.Pt doing well with no bleeding,leaking fluid or pain/cramping.Pt has not done 26w labs done yet. RH + Active FM. Kick counts log given.

## 2018-12-05 ENCOUNTER — HOSPITAL ENCOUNTER (OUTPATIENT)
Dept: LAB | Facility: MEDICAL CENTER | Age: 33
End: 2018-12-05
Attending: OBSTETRICS & GYNECOLOGY
Payer: COMMERCIAL

## 2018-12-05 DIAGNOSIS — Z3A.24 24 WEEKS GESTATION OF PREGNANCY: ICD-10-CM

## 2018-12-05 DIAGNOSIS — E03.8 OTHER SPECIFIED HYPOTHYROIDISM: ICD-10-CM

## 2018-12-05 LAB
ERYTHROCYTE [DISTWIDTH] IN BLOOD BY AUTOMATED COUNT: 42.8 FL (ref 35.9–50)
GLUCOSE 1H P 50 G GLC PO SERPL-MCNC: 108 MG/DL (ref 70–139)
HCT VFR BLD AUTO: 39.7 % (ref 37–47)
HGB BLD-MCNC: 12.5 G/DL (ref 12–16)
MCH RBC QN AUTO: 27.1 PG (ref 27–33)
MCHC RBC AUTO-ENTMCNC: 31.5 G/DL (ref 33.6–35)
MCV RBC AUTO: 86.1 FL (ref 81.4–97.8)
PLATELET # BLD AUTO: 265 K/UL (ref 164–446)
PMV BLD AUTO: 10.9 FL (ref 9–12.9)
RBC # BLD AUTO: 4.61 M/UL (ref 4.2–5.4)
TREPONEMA PALLIDUM IGG+IGM AB [PRESENCE] IN SERUM OR PLASMA BY IMMUNOASSAY: NON REACTIVE
TSH SERPL DL<=0.005 MIU/L-ACNC: 0.98 UIU/ML (ref 0.38–5.33)
WBC # BLD AUTO: 9.4 K/UL (ref 4.8–10.8)

## 2018-12-05 PROCEDURE — 82950 GLUCOSE TEST: CPT

## 2018-12-05 PROCEDURE — 84443 ASSAY THYROID STIM HORMONE: CPT

## 2018-12-05 PROCEDURE — 86780 TREPONEMA PALLIDUM: CPT

## 2018-12-05 PROCEDURE — 36415 COLL VENOUS BLD VENIPUNCTURE: CPT

## 2018-12-05 PROCEDURE — 85027 COMPLETE CBC AUTOMATED: CPT

## 2018-12-06 ENCOUNTER — ROUTINE PRENATAL (OUTPATIENT)
Dept: OBGYN | Facility: MEDICAL CENTER | Age: 33
End: 2018-12-06
Payer: COMMERCIAL

## 2018-12-06 VITALS — DIASTOLIC BLOOD PRESSURE: 78 MMHG | SYSTOLIC BLOOD PRESSURE: 130 MMHG | WEIGHT: 286.6 LBS | BODY MASS INDEX: 47.69 KG/M2

## 2018-12-06 DIAGNOSIS — Z98.891 HISTORY OF CESAREAN DELIVERY: ICD-10-CM

## 2018-12-06 DIAGNOSIS — Z3A.30 30 WEEKS GESTATION OF PREGNANCY: ICD-10-CM

## 2018-12-06 PROCEDURE — 90040 PR PRENATAL FOLLOW UP: CPT | Performed by: OBSTETRICS & GYNECOLOGY

## 2018-12-06 NOTE — PROGRESS NOTES
S: Pt presents for routine OB follow up. Good fetal movement.  No contractions, vaginal bleeding, or leakage of fluid.    Questions answered.    O: /78   Wt (!) 130 kg (286 lb 9.6 oz)   LMP 2018   BMI 47.69 kg/m²   Patients' weight gain, fluid intake and exercise level discussed.  Vitals, fundal height , fetal position, and FHR reviewed on flowsheet    Lab:  Recent Results (from the past 336 hour(s))   TSH    Collection Time: 18  9:05 AM   Result Value Ref Range    TSH 0.980 0.380 - 5.330 uIU/mL   CBC WITHOUT DIFFERENTIAL    Collection Time: 18  9:10 AM   Result Value Ref Range    WBC 9.4 4.8 - 10.8 K/uL    RBC 4.61 4.20 - 5.40 M/uL    Hemoglobin 12.5 12.0 - 16.0 g/dL    Hematocrit 39.7 37.0 - 47.0 %    MCV 86.1 81.4 - 97.8 fL    MCH 27.1 27.0 - 33.0 pg    MCHC 31.5 (L) 33.6 - 35.0 g/dL    RDW 42.8 35.9 - 50.0 fL    Platelet Count 265 164 - 446 K/uL    MPV 10.9 9.0 - 12.9 fL   GLUCOSE 1HR GESTATIONAL    Collection Time: 18  9:10 AM   Result Value Ref Range    Glucose, Post Dose 108 70 - 139 mg/dL   T.PALLIDUM AB EIA    Collection Time: 18  9:10 AM   Result Value Ref Range    Syphilis, Treponemal Qual Non Reactive Non Reactive       A/P:  33 y.o.  at 30w6d presents for routine obstetric follow-up.  Size equals dates and/or scan    1.  Continue prenatal vitamins.  2.  Fetal kick counts.  3.  Exercise at least 30 minutes daily.  4.  Drink at least 2L of water daily  5.  Labor precautions educated.  6.  Follow-up in 2 weeks.  7.  GBS at 35 weeks  8.  Prior C/S x 2, repeat with BTL on

## 2018-12-06 NOTE — PROGRESS NOTES
Ob visit @  30weeks 6 days. Doing well. Active FM. Pt having headaches, denies swelling or dizzyness. Denies bleeding or leaking fluid.

## 2018-12-21 ENCOUNTER — ROUTINE PRENATAL (OUTPATIENT)
Dept: OBGYN | Facility: MEDICAL CENTER | Age: 33
End: 2018-12-21
Payer: COMMERCIAL

## 2018-12-21 VITALS — DIASTOLIC BLOOD PRESSURE: 80 MMHG | WEIGHT: 289.02 LBS | BODY MASS INDEX: 48.1 KG/M2 | SYSTOLIC BLOOD PRESSURE: 110 MMHG

## 2018-12-21 DIAGNOSIS — O26.843 SIZE OF FETUS INCONSISTENT WITH DATES IN THIRD TRIMESTER: ICD-10-CM

## 2018-12-21 DIAGNOSIS — Z34.93 NORMAL PREGNANCY IN THIRD TRIMESTER: ICD-10-CM

## 2018-12-21 DIAGNOSIS — Z98.891 HISTORY OF CESAREAN DELIVERY: ICD-10-CM

## 2018-12-21 PROCEDURE — 90040 PR PRENATAL FOLLOW UP: CPT | Performed by: OBSTETRICS & GYNECOLOGY

## 2018-12-21 NOTE — PROGRESS NOTES
Pt here today for OB follow up  Pt states no complaints  Reports +  Good # [phone]  Pharmacy Confirmed.

## 2018-12-21 NOTE — PROGRESS NOTES
Dictation #1  MRN:[id number]  CSN:[id number]  BRAEDEN:  33w0d    Pt reports doing well.  Denies vaginal bleeding, contractions, LOF.  Reports +FM.    /80   Wt (!) 131.1 kg (289 lb 0.4 oz)   LMP 2018   BMI 48.10 kg/m²   gen: AAO, NAD  FHTs: 140  FH: 37    A/P: 33 y.o.  @ 33w0d by 16wk US w/ hypothyroidism, hx of c/s x2  - PNL up to date, Rh+/-, glucola/3rd tri CBC/syphilis WNL; anatomy US wnl other than non-visualization of outflow tracts, declined referral to MFM for repeat imaging  - s/p Tdap, declines flu - discussed risk of flu in pregnancy including respiratory failure and death, also discussed benefit to baby, especially when born during flu season and cannot be vaccinated until 6 mos  - TSH 0.98 : cont current synthroid dose  - for repeat c/s w/ BTL, plan for .  Order placed  - S>D, will obtain growth US    Reviewed labor precautions (to call or come to hospital for ctx q5min, VB, LOF, decreased FM)    RTC 2wks, GBS that visit    Dorothea Galan MD  [facility], Women's Health

## 2019-01-04 ENCOUNTER — HOSPITAL ENCOUNTER (OUTPATIENT)
Facility: MEDICAL CENTER | Age: 34
End: 2019-01-04
Attending: OBSTETRICS & GYNECOLOGY
Payer: MEDICAID

## 2019-01-04 ENCOUNTER — ROUTINE PRENATAL (OUTPATIENT)
Dept: OBGYN | Facility: MEDICAL CENTER | Age: 34
End: 2019-01-04
Payer: OTHER GOVERNMENT

## 2019-01-04 VITALS — DIASTOLIC BLOOD PRESSURE: 82 MMHG | SYSTOLIC BLOOD PRESSURE: 130 MMHG | WEIGHT: 288.8 LBS | BODY MASS INDEX: 48.06 KG/M2

## 2019-01-04 DIAGNOSIS — Z98.891 HISTORY OF CESAREAN DELIVERY: ICD-10-CM

## 2019-01-04 DIAGNOSIS — O09.93 ENCOUNTER FOR SUPERVISION OF HIGH RISK PREGNANCY IN THIRD TRIMESTER, ANTEPARTUM: ICD-10-CM

## 2019-01-04 DIAGNOSIS — Z34.90 PREGNANCY, UNSPECIFIED GESTATIONAL AGE: ICD-10-CM

## 2019-01-04 PROCEDURE — 87150 DNA/RNA AMPLIFIED PROBE: CPT

## 2019-01-04 PROCEDURE — 90040 PR PRENATAL FOLLOW UP: CPT | Performed by: OBSTETRICS & GYNECOLOGY

## 2019-01-04 PROCEDURE — 87081 CULTURE SCREEN ONLY: CPT

## 2019-01-04 NOTE — PROGRESS NOTES
Ob visit at 35weeks.Doing well but states increasing pelvic pressure.Pt denies bleeding or leaking fluid and feeling only infrequent uc's. GBBS today, NKDA.

## 2019-01-04 NOTE — PROGRESS NOTES
S: Pt presents for routine OB follow up.  No contractions, vaginal bleeding, or leaking fluids. Good fetal movement.  Pt states no contractions but feeling baby sit lower in the pelvis.  Questions answered.    O: /82   Wt (!) 131 kg (288 lb 12.8 oz)   LMP 2018   BMI 48.06 kg/m²   Patients' weight gain, fluid intake and exercise level discussed.  Vitals, fundal height , fetal position, and FHR reviewed on flowsheet    SVE: closed/ thick / post  Fundus: 40cm    Lab:No results found for this or any previous visit (from the past 336 hour(s)).    Prenatal labs:  T&S: O+  Hep B: neg  T.Pall: non reactive in first and third TM  Rubella: immune  HIV: neg  Pap smear: NILM  First TM/Quad: low risk  GBS: today  1 hour: 108     Level II:  anatomy US wnl other than non-visualization of outflow tracts, declined referral to [facility] for repeat imaging    A/P:  33 y.o.  at 35w0d. Patient's last menstrual period was 2018. presents for routine obstetric follow-up. Size equals dates and/or scan  - s/p tdap but declined the flu shot  - TSH 0.98 on 18.   - Hx c/s x 2: for repeat. BTL signed. Referral is placed for 2/  Continue prenatal vitamins.  Begin kick counts at 28 weeks.  Exercise at least 30 minutes daily.  Increase water intake.  Follow-up in 1 weeks.

## 2019-01-06 LAB — GP B STREP DNA SPEC QL NAA+PROBE: POSITIVE

## 2019-01-07 ENCOUNTER — TELEPHONE (OUTPATIENT)
Dept: OBGYN | Facility: CLINIC | Age: 34
End: 2019-01-07

## 2019-01-11 ENCOUNTER — ROUTINE PRENATAL (OUTPATIENT)
Dept: OBGYN | Facility: MEDICAL CENTER | Age: 34
End: 2019-01-11
Payer: OTHER GOVERNMENT

## 2019-01-11 VITALS — SYSTOLIC BLOOD PRESSURE: 128 MMHG | WEIGHT: 288.8 LBS | BODY MASS INDEX: 48.06 KG/M2 | DIASTOLIC BLOOD PRESSURE: 80 MMHG

## 2019-01-11 DIAGNOSIS — O09.93 ENCOUNTER FOR SUPERVISION OF HIGH RISK PREGNANCY IN THIRD TRIMESTER, ANTEPARTUM: ICD-10-CM

## 2019-01-11 DIAGNOSIS — Z98.891 HISTORY OF CESAREAN DELIVERY: ICD-10-CM

## 2019-01-11 PROCEDURE — 90040 PR PRENATAL FOLLOW UP: CPT | Performed by: OBSTETRICS & GYNECOLOGY

## 2019-01-11 NOTE — PROGRESS NOTES
Ob visit @ 36 weeks.Doing well with Active FM.Denies uc's,bleeding or leaking fluid. GBBS + (NKDA)

## 2019-01-12 NOTE — PROGRESS NOTES
S: Pt presents for routine OB follow up.  No contractions, vaginal bleeding, or leaking fluids. Good fetal movement.    Questions answered.    O: /80   Wt (!) 131 kg (288 lb 12.8 oz)   LMP 2018   BMI 48.06 kg/m²   Patients' weight gain, fluid intake and exercise level discussed.  Vitals, fundal height , fetal position, and FHR reviewed on flowsheet    SVE: deferred  FH: 150  Fundus: 40    Lab:  Recent Results (from the past 336 hour(s))   GRP B STREP, BY PCR (BRICE BROTH)    Collection Time: 19  2:38 PM   Result Value Ref Range    Strep Gp B DNA PCR POSITIVE (A) Negative       Prenatal labs:  T&S: O+  Hep B: neg  T.Pall: non reactive in first and third TM  Rubella: immune  HIV: neg  Pap smear: NILM  First TM/Quad: low risk  GBS: today  1 hour: 108      Level II:  anatomy US wnl other than non-visualization of outflow tracts, declined referral to [facility] for repeat imaging    A/P:  33 y.o.  at 36w0d based on LMP presents for routine obstetric follow-up.   Patient's last menstrual period was 2018.   - Size greater than dates --> new order for growth given. Pt states will try to schedule for next week.  - s/p tdap but declined the flu shot  - TSH 0.98 on 18.   - Delivery plan: repeat  and BTL scheduled   - Postpartum prophylaxis: BTL  - Continue prenatal vitamins.  - Begin kick counts at 28 weeks.  - Exercise at least 30 minutes daily.  - Increase water intake.  - Follow-up in 1 weeks.

## 2019-01-15 NOTE — PROGRESS NOTES
Subjective:      Chey Guo is a 32 y.o. female who presents with Cyst (Rt breast redness, pain and swelling, pain radiates to armpit. Pt is currently nursing 1 month old child. )        Cyst   Pertinent negatives include no myalgias, nausea or vomiting.   Patient presents today for about 2-3 days of worsening possible cyst/skin infection of her right breast area. Patient notes she has had skin nodules and cysts in the past, never treated by medical provider and usually go away.  Patient states she is currently breast feeding her 6 week old infant.  She notes the pain and swelling has intensified in the last few days.  She has not had any drainage.   She did not fevers or chills at home. Does have some pain radiating into her right arm pit.  Has been taking Ibuprofen with mild pain relief.     Review of Systems   Constitutional: Negative.    Respiratory: Negative.    Cardiovascular: Negative.    Gastrointestinal: Negative for nausea and vomiting.   Musculoskeletal: Negative for joint pain and myalgias.   Skin:        SEE HPI   Neurological: Negative.    Endo/Heme/Allergies: Negative.        PMH:  has a past medical history of Asthma; Previous  delivery, antepartum condition or complication (2017); Supervision of other high risk pregnancy, antepartum (2017); and Thyroid disease.  MEDS:   Current Outpatient Prescriptions:   •  cephALEXin (KEFLEX) 500 MG Cap, Take 1 Cap by mouth 4 times a day for 10 days., Disp: 40 Cap, Rfl: 0  •  LEVOTHYROXINE SODIUM PO, Take  by mouth every morning., Disp: , Rfl:   •  Prenatal Multivit-Min-Fe-FA (PRENATAL VITAMINS PO), Take  by mouth., Disp: , Rfl:   ALLERGIES: No Known Allergies  SURGHX:   Past Surgical History:   Procedure Laterality Date   • REPEAT C SECTION Bilateral 2018    Procedure: REPEAT C SECTION;  Surgeon: Dilcia Jackson M.D.;  Location: LABOR AND DELIVERY;  Service: Labor and Delivery   • PRIMARY C SECTION           SOCHX:  reports  "that she quit smoking about 10 months ago. Her smoking use included Cigarettes. She has a 2.50 pack-year smoking history. She has never used smokeless tobacco. She reports that she does not drink alcohol or use drugs.  FH: Family history was reviewed, no pertinent findings to report     Objective:     /64   Pulse 70   Temp 37.9 °C (100.2 °F)   Ht 1.651 m (5' 5\")   Wt 121.1 kg (267 lb)   SpO2 99%   BMI 44.43 kg/m²      Physical Exam   Constitutional: She appears well-developed and well-nourished. No distress.   Neck: Normal range of motion. Neck supple.   Cardiovascular: Normal rate and regular rhythm.    Pulmonary/Chest: Effort normal and breath sounds normal.       Lymphadenopathy:     She has no cervical adenopathy.     She has no axillary adenopathy.   Skin: Skin is warm and dry.   Psychiatric: She has a normal mood and affect. Her behavior is normal.   Vitals reviewed.        Verbal consent was given for incision and drainage of right breast abscess.   Area was cleaned with Iodine solution.   Using clean technique 4 cc of 2% lidocaine was injected around the area of the abscess with 1 cc of lidocaine injected in a superficial wheel formation above the abscess.   Using a sterile blade a small incision was made on top of the abscess. Moderate amounts of purulent drainage was drained from the abscess.  Blunt dissection was used to break up loculations.   Packing placed.   Dressing was then applied.   Wound care instructions given and pt tolerated procedure well.       Assessment/Plan:     1. Mastitis in female  cephALEXin (KEFLEX) 500 MG Cap   2. Abscess of female breast  cephALEXin (KEFLEX) 500 MG Cap    cefTRIAXone (ROCEPHIN) injection 1 g    CULTURE WOUND W/ GRAM STAIN       -culture taken  -patient unable to make it to pharmacy tonight.  She is was given 1 gram Rocephin to initiate mastitis coverage tonight.   -declines anything for pain rx as she is planning to continue to breast feed if possible.  "  Recommend pump/dump from right breast at this time until symptoms resolved  -cleaned and dressed, wound care discussed.   -work note provided  -patient tolerated procedure well.  48 hours for recheck and packing removal, change if needed  RTC sooner if needed      Supportive care, differential diagnoses, and indications for immediate follow-up discussed with patient.   Pathogenesis of diagnosis discussed including typical length and natural progression.   Instructed to return to clinic or nearest emergency department for any change in condition, further concerns, or worsening of symptoms.  Patient states understanding of the plan of care and discharge instructions.      Trixie Rees P.A.-C.               Spiral Flap Text: The defect edges were debeveled with a #15 scalpel blade.  Given the location of the defect, shape of the defect and the proximity to free margins a spiral flap was deemed most appropriate.  Using a sterile surgical marker, an appropriate rotation flap was drawn incorporating the defect and placing the expected incisions within the relaxed skin tension lines where possible. The area thus outlined was incised deep to adipose tissue with a #15 scalpel blade.  The skin margins were undermined to an appropriate distance in all directions utilizing iris scissors.

## 2019-01-18 ENCOUNTER — HOSPITAL ENCOUNTER (OUTPATIENT)
Dept: RADIOLOGY | Facility: MEDICAL CENTER | Age: 34
End: 2019-01-18
Attending: OBSTETRICS & GYNECOLOGY
Payer: OTHER GOVERNMENT

## 2019-01-18 ENCOUNTER — ROUTINE PRENATAL (OUTPATIENT)
Dept: OBGYN | Facility: MEDICAL CENTER | Age: 34
End: 2019-01-18
Payer: OTHER GOVERNMENT

## 2019-01-18 VITALS
BODY MASS INDEX: 48.43 KG/M2 | DIASTOLIC BLOOD PRESSURE: 80 MMHG | SYSTOLIC BLOOD PRESSURE: 130 MMHG | WEIGHT: 291.01 LBS

## 2019-01-18 DIAGNOSIS — O09.93 ENCOUNTER FOR SUPERVISION OF HIGH RISK PREGNANCY IN THIRD TRIMESTER, ANTEPARTUM: ICD-10-CM

## 2019-01-18 DIAGNOSIS — O26.843 SIZE OF FETUS INCONSISTENT WITH DATES IN THIRD TRIMESTER: ICD-10-CM

## 2019-01-18 PROCEDURE — 76816 OB US FOLLOW-UP PER FETUS: CPT

## 2019-01-18 PROCEDURE — 90040 PR PRENATAL FOLLOW UP: CPT | Performed by: OBSTETRICS & GYNECOLOGY

## 2019-01-18 NOTE — PROGRESS NOTES
S: Pt presents for routine OB follow up.  No contractions, vaginal bleeding, or leaking fluids. Good fetal movement.  Having her US done for growth this afternoon    Questions answered.    O: LMP 2018   Patients' weight gain, fluid intake and exercise level discussed.  Vitals, fundal height , fetal position, and FHR reviewed on flowsheet    SVE: deferred  FH: 140  Fundus: 43cm    Lab:No results found for this or any previous visit (from the past 336 hour(s)).    Prenatal labs:  T&S: O+  Hep B: neg  T.Pall: non reactive in first and third TM  Rubella: immune  HIV: neg  Pap smear: NILM  First TM/Quad: low risk  GBS: positive  1 hour: 108      Level II:  anatomy US wnl other than non-visualization of outflow tracts, declined referral to [facility] for repeat imaging    A/P:  33 y.o.  at 37w0d based on LMP c/w US w/ Hx c/s x 2 presents for routine obstetric follow-up.   Patient's last menstrual period was 2018.   - Size greater than dates --> new order for growth given and appt made for next week   - s/p tdap but declined the flu shot  - GBS positive - reviewed with patient. Would go to triage if LOF, VB, or contractions.  - TSH 0.98 on 18.   - Delivery plan: repeat  and BTL scheduled   - Postpartum prophylaxis: BTL  - Continue prenatal vitamins.  - Begin kick counts at 28 weeks.  - Exercise at least 30 minutes daily.  - Increase water intake.  - Follow-up in 1 week

## 2019-01-18 NOTE — PROGRESS NOTES
Ob visit @ 37weeks,Doing well, denies uc's, bleeding leaking fluid or pain/cramping.Pt is scheduled for ultrasound this pm.Active FM. Declines exam today.

## 2019-01-23 ENCOUNTER — APPOINTMENT (OUTPATIENT)
Dept: ADMISSIONS | Facility: MEDICAL CENTER | Age: 34
End: 2019-01-23
Attending: OBSTETRICS & GYNECOLOGY
Payer: OTHER GOVERNMENT

## 2019-01-25 ENCOUNTER — ROUTINE PRENATAL (OUTPATIENT)
Dept: OBGYN | Facility: MEDICAL CENTER | Age: 34
End: 2019-01-25
Payer: OTHER GOVERNMENT

## 2019-01-25 VITALS — WEIGHT: 293 LBS | DIASTOLIC BLOOD PRESSURE: 78 MMHG | BODY MASS INDEX: 49.12 KG/M2 | SYSTOLIC BLOOD PRESSURE: 128 MMHG

## 2019-01-25 DIAGNOSIS — Z98.891 HISTORY OF CESAREAN DELIVERY: ICD-10-CM

## 2019-01-25 DIAGNOSIS — O09.93 ENCOUNTER FOR SUPERVISION OF HIGH RISK PREGNANCY IN THIRD TRIMESTER, ANTEPARTUM: ICD-10-CM

## 2019-01-25 PROCEDURE — 90040 PR PRENATAL FOLLOW UP: CPT | Performed by: OBSTETRICS & GYNECOLOGY

## 2019-01-25 NOTE — PROGRESS NOTES
Pt here today for OB follow up  Pt states denies VB and LOF  Reports +FM  Good # [phone]  Pharmacy Confirmed.  Chaperone offered and accepted.

## 2019-01-25 NOTE — PROGRESS NOTES
S: Pt presents for routine OB follow up.  No contractions, vaginal bleeding, or leaking fluids. Good fetal movement.    Questions answered.    O: /78   Wt (!) 133.9 kg (295 lb 3.1 oz)   LMP 2018   BMI 49.12 kg/m²   Patients' weight gain, fluid intake and exercise level discussed.  Vitals, fundal height , fetal position, and FHR reviewed on flowsheet    FH: 152  Fundus: 43cm    Lab:No results found for this or any previous visit (from the past 336 hour(s)).     Prenatal labs:  T&S: O+  Hep B: neg  T.Pall: non reactive in first and third TM  Rubella: immune  HIV: neg  Pap smear: NILM  First TM/Quad: low risk  GBS: positive  1 hour: 108      Level II:  anatomy US wnl other than non-visualization of outflow tracts, declined referral to [facility] for repeat imaging  Repeat growth 2019: 3479grams 82% [place]    A/P:  33 y.o.  at 38w0d based on LMP with hx c/s x 2 presents for routine obstetric follow-up.   Patient's last menstrual period was 2018.   Growth 82%tile.   - Delivery plan: repeat c/s and BTL   - Postpartum prophylaxis: BTL  - GBS positive - reviewed with patient. Would go to triage if LOF, VB, or contractions.  - TSH 0.98 on 18.   - Continue prenatal vitamins.  - Begin kick counts at 28 weeks.  - Exercise at least 30 minutes daily.  - Increase water intake.  - NPO after midnight day of surgery  -  discussion: patient scheduled for preop with Dr. Duarte next week but asking if she can make today her preop as she lives far away and is trying to work all the way up to her dates   Be at L&D 2 hours prior to scheduled time   Labor precautions    Discussed with the patient the risks of  delivery. The risks include pain, infection, bleeding, scarring, damage to other organs in the area of operation. Specifically organs that can be damaged are bowel, bladder, ureters, fallopian tubes, and ovaries. I also discussed with the patient the risk of wound infection and wound  breakdown. We discussed that these risks are greater in people that have diabetes or obesity. I also discussed the risk of emergency blood transfusion during procedure as well as emergency hysterectomy during procedure.    Discussed today with a risks of tubal ligation. I discussed that the tubal is considered a permanent procedure and the patient will no longer be able to bear children following a tubal. I discussed other forms of birth control which include pills, barrier methods, Depo-Provera, IUD or male sterilization. We discussed  medications or procedures are nonpermanent nor are they surgical. I also discussed the risk of tubal failure with the patient which is one in 200 procedures. We discussed that should the tubal fail there is a risk for ectopic pregnancy which may require surgical intervention.    Patient wishes to have a concurrent tubal ligation.     Patient had the opportunity to ask questions regarding procedures. All questions answered to the patient's satisfaction.

## 2019-01-29 ENCOUNTER — APPOINTMENT (OUTPATIENT)
Dept: OBGYN | Facility: MEDICAL CENTER | Age: 34
End: 2019-01-29
Payer: OTHER GOVERNMENT

## 2019-02-01 ENCOUNTER — HOSPITAL ENCOUNTER (INPATIENT)
Facility: MEDICAL CENTER | Age: 34
LOS: 2 days | End: 2019-02-03
Attending: OBSTETRICS & GYNECOLOGY | Admitting: OBSTETRICS & GYNECOLOGY
Payer: MEDICAID

## 2019-02-01 DIAGNOSIS — G89.18 POST-OPERATIVE PAIN: ICD-10-CM

## 2019-02-01 LAB
BASOPHILS # BLD AUTO: 0.2 % (ref 0–1.8)
BASOPHILS # BLD: 0.02 K/UL (ref 0–0.12)
EOSINOPHIL # BLD AUTO: 0.09 K/UL (ref 0–0.51)
EOSINOPHIL NFR BLD: 1.1 % (ref 0–6.9)
ERYTHROCYTE [DISTWIDTH] IN BLOOD BY AUTOMATED COUNT: 43.2 FL (ref 35.9–50)
ERYTHROCYTE [DISTWIDTH] IN BLOOD BY AUTOMATED COUNT: 43.8 FL (ref 35.9–50)
HCT VFR BLD AUTO: 35.7 % (ref 37–47)
HCT VFR BLD AUTO: 39.1 % (ref 37–47)
HGB BLD-MCNC: 11.5 G/DL (ref 12–16)
HGB BLD-MCNC: 12.8 G/DL (ref 12–16)
HOLDING TUBE BB 8507: NORMAL
IMM GRANULOCYTES # BLD AUTO: 0.06 K/UL (ref 0–0.11)
IMM GRANULOCYTES NFR BLD AUTO: 0.7 % (ref 0–0.9)
LYMPHOCYTES # BLD AUTO: 1.71 K/UL (ref 1–4.8)
LYMPHOCYTES NFR BLD: 20 % (ref 22–41)
MCH RBC QN AUTO: 27.3 PG (ref 27–33)
MCH RBC QN AUTO: 27.9 PG (ref 27–33)
MCHC RBC AUTO-ENTMCNC: 32.2 G/DL (ref 33.6–35)
MCHC RBC AUTO-ENTMCNC: 32.7 G/DL (ref 33.6–35)
MCV RBC AUTO: 84.8 FL (ref 81.4–97.8)
MCV RBC AUTO: 85.2 FL (ref 81.4–97.8)
MONOCYTES # BLD AUTO: 0.59 K/UL (ref 0–0.85)
MONOCYTES NFR BLD AUTO: 6.9 % (ref 0–13.4)
NEUTROPHILS # BLD AUTO: 6.06 K/UL (ref 2–7.15)
NEUTROPHILS NFR BLD: 71.1 % (ref 44–72)
NRBC # BLD AUTO: 0 K/UL
NRBC BLD-RTO: 0 /100 WBC
PLATELET # BLD AUTO: 245 K/UL (ref 164–446)
PLATELET # BLD AUTO: 250 K/UL (ref 164–446)
PMV BLD AUTO: 10.6 FL (ref 9–12.9)
PMV BLD AUTO: 11.1 FL (ref 9–12.9)
RBC # BLD AUTO: 4.21 M/UL (ref 4.2–5.4)
RBC # BLD AUTO: 4.59 M/UL (ref 4.2–5.4)
WBC # BLD AUTO: 16 K/UL (ref 4.8–10.8)
WBC # BLD AUTO: 8.5 K/UL (ref 4.8–10.8)

## 2019-02-01 PROCEDURE — 700105 HCHG RX REV CODE 258: Performed by: OBSTETRICS & GYNECOLOGY

## 2019-02-01 PROCEDURE — 58611 LIGATE OVIDUCT(S) ADD-ON: CPT | Performed by: OBSTETRICS & GYNECOLOGY

## 2019-02-01 PROCEDURE — 503172 HCHG FILSHIE CLIPS (PAIR)

## 2019-02-01 PROCEDURE — 770002 HCHG ROOM/CARE - OB PRIVATE (112)

## 2019-02-01 PROCEDURE — 700105 HCHG RX REV CODE 258: Performed by: ANESTHESIOLOGY

## 2019-02-01 PROCEDURE — 59025 FETAL NON-STRESS TEST: CPT

## 2019-02-01 PROCEDURE — A9270 NON-COVERED ITEM OR SERVICE: HCPCS | Performed by: ANESTHESIOLOGY

## 2019-02-01 PROCEDURE — 700111 HCHG RX REV CODE 636 W/ 250 OVERRIDE (IP): Performed by: ANESTHESIOLOGY

## 2019-02-01 PROCEDURE — 700102 HCHG RX REV CODE 250 W/ 637 OVERRIDE(OP): Performed by: OBSTETRICS & GYNECOLOGY

## 2019-02-01 PROCEDURE — 700111 HCHG RX REV CODE 636 W/ 250 OVERRIDE (IP)

## 2019-02-01 PROCEDURE — 700102 HCHG RX REV CODE 250 W/ 637 OVERRIDE(OP): Performed by: ANESTHESIOLOGY

## 2019-02-01 PROCEDURE — 306828 HCHG ANES-TIME GENERAL: Performed by: OBSTETRICS & GYNECOLOGY

## 2019-02-01 PROCEDURE — 36415 COLL VENOUS BLD VENIPUNCTURE: CPT

## 2019-02-01 PROCEDURE — 58611 LIGATE OVIDUCT(S) ADD-ON: CPT | Mod: 80

## 2019-02-01 PROCEDURE — 59514 CESAREAN DELIVERY ONLY: CPT

## 2019-02-01 PROCEDURE — 305385 HCHG SURGICAL SERVICES 1/4 HOUR: Performed by: OBSTETRICS & GYNECOLOGY

## 2019-02-01 PROCEDURE — 700101 HCHG RX REV CODE 250: Performed by: OBSTETRICS & GYNECOLOGY

## 2019-02-01 PROCEDURE — 304964 HCHG RECOVERY ROOM TIME 1HR: Performed by: OBSTETRICS & GYNECOLOGY

## 2019-02-01 PROCEDURE — 85025 COMPLETE CBC W/AUTO DIFF WBC: CPT

## 2019-02-01 PROCEDURE — 0UL70CZ OCCLUSION OF BILATERAL FALLOPIAN TUBES WITH EXTRALUMINAL DEVICE, OPEN APPROACH: ICD-10-PCS | Performed by: OBSTETRICS & GYNECOLOGY

## 2019-02-01 PROCEDURE — A9270 NON-COVERED ITEM OR SERVICE: HCPCS | Performed by: OBSTETRICS & GYNECOLOGY

## 2019-02-01 PROCEDURE — 700111 HCHG RX REV CODE 636 W/ 250 OVERRIDE (IP): Performed by: OBSTETRICS & GYNECOLOGY

## 2019-02-01 PROCEDURE — 85027 COMPLETE CBC AUTOMATED: CPT

## 2019-02-01 PROCEDURE — 59514 CESAREAN DELIVERY ONLY: CPT | Mod: 80

## 2019-02-01 PROCEDURE — 59510 CESAREAN DELIVERY: CPT | Performed by: OBSTETRICS & GYNECOLOGY

## 2019-02-01 RX ORDER — HYDROMORPHONE HYDROCHLORIDE 1 MG/ML
0.2 INJECTION, SOLUTION INTRAMUSCULAR; INTRAVENOUS; SUBCUTANEOUS
Status: DISCONTINUED | OUTPATIENT
Start: 2019-02-01 | End: 2019-02-01

## 2019-02-01 RX ORDER — SODIUM CHLORIDE, SODIUM GLUCONATE, SODIUM ACETATE, POTASSIUM CHLORIDE AND MAGNESIUM CHLORIDE 526; 502; 368; 37; 30 MG/100ML; MG/100ML; MG/100ML; MG/100ML; MG/100ML
1500 INJECTION, SOLUTION INTRAVENOUS ONCE
Status: COMPLETED | OUTPATIENT
Start: 2019-02-01 | End: 2019-02-01

## 2019-02-01 RX ORDER — MISOPROSTOL 200 UG/1
600 TABLET ORAL
Status: DISCONTINUED | OUTPATIENT
Start: 2019-02-01 | End: 2019-02-03 | Stop reason: HOSPADM

## 2019-02-01 RX ORDER — HYDROMORPHONE HYDROCHLORIDE 1 MG/ML
0.4 INJECTION, SOLUTION INTRAMUSCULAR; INTRAVENOUS; SUBCUTANEOUS
Status: ACTIVE | OUTPATIENT
Start: 2019-02-01 | End: 2019-02-02

## 2019-02-01 RX ORDER — METHYLERGONOVINE MALEATE 0.2 MG/ML
0.2 INJECTION INTRAVENOUS
Status: DISCONTINUED | OUTPATIENT
Start: 2019-02-01 | End: 2019-02-01

## 2019-02-01 RX ORDER — MEPERIDINE HYDROCHLORIDE 25 MG/ML
6.25 INJECTION INTRAMUSCULAR; INTRAVENOUS; SUBCUTANEOUS
Status: DISCONTINUED | OUTPATIENT
Start: 2019-02-01 | End: 2019-02-01

## 2019-02-01 RX ORDER — CARBOPROST TROMETHAMINE 250 UG/ML
250 INJECTION, SOLUTION INTRAMUSCULAR
Status: DISCONTINUED | OUTPATIENT
Start: 2019-02-01 | End: 2019-02-03 | Stop reason: HOSPADM

## 2019-02-01 RX ORDER — SODIUM CHLORIDE, SODIUM LACTATE, POTASSIUM CHLORIDE, CALCIUM CHLORIDE 600; 310; 30; 20 MG/100ML; MG/100ML; MG/100ML; MG/100ML
INJECTION, SOLUTION INTRAVENOUS CONTINUOUS
Status: DISCONTINUED | OUTPATIENT
Start: 2019-02-01 | End: 2019-02-01

## 2019-02-01 RX ORDER — METOCLOPRAMIDE HYDROCHLORIDE 5 MG/ML
10 INJECTION INTRAMUSCULAR; INTRAVENOUS ONCE
Status: COMPLETED | OUTPATIENT
Start: 2019-02-01 | End: 2019-02-01

## 2019-02-01 RX ORDER — CARBOPROST TROMETHAMINE 250 UG/ML
250 INJECTION, SOLUTION INTRAMUSCULAR
Status: DISCONTINUED | OUTPATIENT
Start: 2019-02-01 | End: 2019-02-01

## 2019-02-01 RX ORDER — DIPHENHYDRAMINE HYDROCHLORIDE 50 MG/ML
12.5 INJECTION INTRAMUSCULAR; INTRAVENOUS
Status: DISCONTINUED | OUTPATIENT
Start: 2019-02-01 | End: 2019-02-01

## 2019-02-01 RX ORDER — KETOROLAC TROMETHAMINE 30 MG/ML
30 INJECTION, SOLUTION INTRAMUSCULAR; INTRAVENOUS EVERY 6 HOURS
Status: DISCONTINUED | OUTPATIENT
Start: 2019-02-01 | End: 2019-02-01

## 2019-02-01 RX ORDER — HYDROMORPHONE HYDROCHLORIDE 1 MG/ML
0.1 INJECTION, SOLUTION INTRAMUSCULAR; INTRAVENOUS; SUBCUTANEOUS
Status: DISCONTINUED | OUTPATIENT
Start: 2019-02-01 | End: 2019-02-01

## 2019-02-01 RX ORDER — LEVOTHYROXINE SODIUM 0.07 MG/1
75 TABLET ORAL EVERY MORNING
Status: DISCONTINUED | OUTPATIENT
Start: 2019-02-01 | End: 2019-02-03 | Stop reason: HOSPADM

## 2019-02-01 RX ORDER — OXYCODONE HCL 5 MG/5 ML
10 SOLUTION, ORAL ORAL
Status: DISCONTINUED | OUTPATIENT
Start: 2019-02-01 | End: 2019-02-01

## 2019-02-01 RX ORDER — ONDANSETRON 2 MG/ML
4 INJECTION INTRAMUSCULAR; INTRAVENOUS
Status: DISCONTINUED | OUTPATIENT
Start: 2019-02-01 | End: 2019-02-01

## 2019-02-01 RX ORDER — SIMETHICONE 80 MG
80 TABLET,CHEWABLE ORAL 4 TIMES DAILY PRN
Status: DISCONTINUED | OUTPATIENT
Start: 2019-02-01 | End: 2019-02-03 | Stop reason: HOSPADM

## 2019-02-01 RX ORDER — VITAMIN A ACETATE, BETA CAROTENE, ASCORBIC ACID, CHOLECALCIFEROL, .ALPHA.-TOCOPHEROL ACETATE, DL-, THIAMINE MONONITRATE, RIBOFLAVIN, NIACINAMIDE, PYRIDOXINE HYDROCHLORIDE, FOLIC ACID, CYANOCOBALAMIN, CALCIUM CARBONATE, FERROUS FUMARATE, ZINC OXIDE, CUPRIC OXIDE 3080; 12; 120; 400; 1; 1.84; 3; 20; 22; 920; 25; 200; 27; 10; 2 [IU]/1; UG/1; MG/1; [IU]/1; MG/1; MG/1; MG/1; MG/1; MG/1; [IU]/1; MG/1; MG/1; MG/1; MG/1; MG/1
1 TABLET, FILM COATED ORAL DAILY
Status: DISCONTINUED | OUTPATIENT
Start: 2019-02-01 | End: 2019-02-01

## 2019-02-01 RX ORDER — DIPHENHYDRAMINE HYDROCHLORIDE 50 MG/ML
12.5 INJECTION INTRAMUSCULAR; INTRAVENOUS EVERY 6 HOURS PRN
Status: ACTIVE | OUTPATIENT
Start: 2019-02-01 | End: 2019-02-02

## 2019-02-01 RX ORDER — HYDROMORPHONE HYDROCHLORIDE 1 MG/ML
0.2 INJECTION, SOLUTION INTRAMUSCULAR; INTRAVENOUS; SUBCUTANEOUS
Status: ACTIVE | OUTPATIENT
Start: 2019-02-01 | End: 2019-02-02

## 2019-02-01 RX ORDER — VITAMIN A ACETATE, BETA CAROTENE, ASCORBIC ACID, CHOLECALCIFEROL, .ALPHA.-TOCOPHEROL ACETATE, DL-, THIAMINE MONONITRATE, RIBOFLAVIN, NIACINAMIDE, PYRIDOXINE HYDROCHLORIDE, FOLIC ACID, CYANOCOBALAMIN, CALCIUM CARBONATE, FERROUS FUMARATE, ZINC OXIDE, CUPRIC OXIDE 3080; 12; 120; 400; 1; 1.84; 3; 20; 22; 920; 25; 200; 27; 10; 2 [IU]/1; UG/1; MG/1; [IU]/1; MG/1; MG/1; MG/1; MG/1; MG/1; [IU]/1; MG/1; MG/1; MG/1; MG/1; MG/1
1 TABLET, FILM COATED ORAL EVERY MORNING
Status: DISCONTINUED | OUTPATIENT
Start: 2019-02-01 | End: 2019-02-03 | Stop reason: HOSPADM

## 2019-02-01 RX ORDER — BISACODYL 10 MG
10 SUPPOSITORY, RECTAL RECTAL PRN
Status: DISCONTINUED | OUTPATIENT
Start: 2019-02-01 | End: 2019-02-03 | Stop reason: HOSPADM

## 2019-02-01 RX ORDER — DOCUSATE SODIUM 100 MG/1
100 CAPSULE, LIQUID FILLED ORAL 2 TIMES DAILY PRN
Status: DISCONTINUED | OUTPATIENT
Start: 2019-02-01 | End: 2019-02-03 | Stop reason: HOSPADM

## 2019-02-01 RX ORDER — CITRIC ACID/SODIUM CITRATE 334-500MG
30 SOLUTION, ORAL ORAL ONCE
Status: COMPLETED | OUTPATIENT
Start: 2019-02-01 | End: 2019-02-01

## 2019-02-01 RX ORDER — ACETAMINOPHEN 500 MG
1000 TABLET ORAL EVERY 6 HOURS
Status: COMPLETED | OUTPATIENT
Start: 2019-02-01 | End: 2019-02-02

## 2019-02-01 RX ORDER — METHYLERGONOVINE MALEATE 0.2 MG/ML
0.2 INJECTION INTRAVENOUS
Status: DISCONTINUED | OUTPATIENT
Start: 2019-02-01 | End: 2019-02-03 | Stop reason: HOSPADM

## 2019-02-01 RX ORDER — OXYCODONE HCL 5 MG/5 ML
5 SOLUTION, ORAL ORAL
Status: DISCONTINUED | OUTPATIENT
Start: 2019-02-01 | End: 2019-02-01

## 2019-02-01 RX ORDER — CEFAZOLIN SODIUM 1 G/3ML
2 INJECTION, POWDER, FOR SOLUTION INTRAMUSCULAR; INTRAVENOUS ONCE
Status: DISCONTINUED | OUTPATIENT
Start: 2019-02-01 | End: 2019-02-01 | Stop reason: HOSPADM

## 2019-02-01 RX ORDER — ONDANSETRON 2 MG/ML
4 INJECTION INTRAMUSCULAR; INTRAVENOUS EVERY 6 HOURS PRN
Status: ACTIVE | OUTPATIENT
Start: 2019-02-01 | End: 2019-02-02

## 2019-02-01 RX ORDER — HALOPERIDOL 5 MG/ML
1 INJECTION INTRAMUSCULAR
Status: DISCONTINUED | OUTPATIENT
Start: 2019-02-01 | End: 2019-02-01

## 2019-02-01 RX ORDER — SODIUM CHLORIDE, SODIUM LACTATE, POTASSIUM CHLORIDE, CALCIUM CHLORIDE 600; 310; 30; 20 MG/100ML; MG/100ML; MG/100ML; MG/100ML
INJECTION, SOLUTION INTRAVENOUS PRN
Status: DISCONTINUED | OUTPATIENT
Start: 2019-02-01 | End: 2019-02-03 | Stop reason: HOSPADM

## 2019-02-01 RX ORDER — KETOROLAC TROMETHAMINE 30 MG/ML
30 INJECTION, SOLUTION INTRAMUSCULAR; INTRAVENOUS EVERY 6 HOURS
Status: COMPLETED | OUTPATIENT
Start: 2019-02-01 | End: 2019-02-02

## 2019-02-01 RX ORDER — OXYTOCIN 10 [USP'U]/ML
10 INJECTION, SOLUTION INTRAMUSCULAR; INTRAVENOUS ONCE
Status: DISCONTINUED | OUTPATIENT
Start: 2019-02-01 | End: 2019-02-01

## 2019-02-01 RX ORDER — MISOPROSTOL 200 UG/1
800 TABLET ORAL
Status: DISCONTINUED | OUTPATIENT
Start: 2019-02-01 | End: 2019-02-01

## 2019-02-01 RX ORDER — HYDROMORPHONE HYDROCHLORIDE 1 MG/ML
0.4 INJECTION, SOLUTION INTRAMUSCULAR; INTRAVENOUS; SUBCUTANEOUS
Status: DISCONTINUED | OUTPATIENT
Start: 2019-02-01 | End: 2019-02-01

## 2019-02-01 RX ORDER — OXYCODONE HYDROCHLORIDE 10 MG/1
10 TABLET ORAL EVERY 4 HOURS PRN
Status: ACTIVE | OUTPATIENT
Start: 2019-02-01 | End: 2019-02-02

## 2019-02-01 RX ORDER — DIPHENHYDRAMINE HYDROCHLORIDE 50 MG/ML
25 INJECTION INTRAMUSCULAR; INTRAVENOUS EVERY 6 HOURS PRN
Status: ACTIVE | OUTPATIENT
Start: 2019-02-01 | End: 2019-02-02

## 2019-02-01 RX ORDER — OXYCODONE HYDROCHLORIDE 5 MG/1
5 TABLET ORAL EVERY 4 HOURS PRN
Status: ACTIVE | OUTPATIENT
Start: 2019-02-01 | End: 2019-02-02

## 2019-02-01 RX ADMIN — SODIUM CHLORIDE, SODIUM GLUCONATE, SODIUM ACETATE, POTASSIUM CHLORIDE AND MAGNESIUM CHLORIDE 1500 ML: 526; 502; 368; 37; 30 INJECTION, SOLUTION INTRAVENOUS at 08:00

## 2019-02-01 RX ADMIN — Medication: at 09:30

## 2019-02-01 RX ADMIN — ACETAMINOPHEN 1000 MG: 500 TABLET ORAL at 14:29

## 2019-02-01 RX ADMIN — METOCLOPRAMIDE 10 MG: 5 INJECTION, SOLUTION INTRAMUSCULAR; INTRAVENOUS at 09:15

## 2019-02-01 RX ADMIN — FAMOTIDINE 20 MG: 10 INJECTION INTRAVENOUS at 09:15

## 2019-02-01 RX ADMIN — KETOROLAC TROMETHAMINE 30 MG: 30 INJECTION, SOLUTION INTRAMUSCULAR; INTRAVENOUS at 17:32

## 2019-02-01 RX ADMIN — KETOROLAC TROMETHAMINE 30 MG: 30 INJECTION, SOLUTION INTRAMUSCULAR; INTRAVENOUS at 23:16

## 2019-02-01 RX ADMIN — ACETAMINOPHEN 1000 MG: 500 TABLET ORAL at 20:25

## 2019-02-01 RX ADMIN — SODIUM CITRATE AND CITRIC ACID MONOHYDRATE 30 ML: 500; 334 SOLUTION ORAL at 09:15

## 2019-02-01 ASSESSMENT — PATIENT HEALTH QUESTIONNAIRE - PHQ9
2. FEELING DOWN, DEPRESSED, IRRITABLE, OR HOPELESS: NOT AT ALL
1. LITTLE INTEREST OR PLEASURE IN DOING THINGS: NOT AT ALL
SUM OF ALL RESPONSES TO PHQ9 QUESTIONS 1 AND 2: 0

## 2019-02-01 ASSESSMENT — COPD QUESTIONNAIRES
COPD SCREENING SCORE: 0
DO YOU EVER COUGH UP ANY MUCUS OR PHLEGM?: NO/ONLY WITH OCCASIONAL COLDS OR INFECTIONS
IN THE PAST 12 MONTHS DO YOU DO LESS THAN YOU USED TO BECAUSE OF YOUR BREATHING PROBLEMS: DISAGREE/UNSURE
DURING THE PAST 4 WEEKS HOW MUCH DID YOU FEEL SHORT OF BREATH: NONE/LITTLE OF THE TIME
HAVE YOU SMOKED AT LEAST 100 CIGARETTES IN YOUR ENTIRE LIFE: NO/DON'T KNOW

## 2019-02-01 ASSESSMENT — LIFESTYLE VARIABLES
EVER_SMOKED: NEVER
ALCOHOL_USE: NO

## 2019-02-01 NOTE — H&P
"OB H&P:  [place] resident: Esther Quach MD, PGY-1  Attending: Dr. Duarte    CC: scheduled repeat  section with bilateral tubal ligation    HPI:  Ms. Chey Guo is a 33 y.o.  39w0d by LN c/w first trimester ultrasound here for scheduled repeat  section followed by BTL.     Contractions: Yes   Loss of fluid: No   Vaginal bleeding: No   Fetal movement: present    PNC with TPC    PNL:  ABO: O, antibody negative  Rhpositive, Rubella immune, HIV neg, RPR neg, HBsAg NR, GC/CT neg/neg  Glucola: negative  GBS positive  First TM/Quad: low risk    Level II:  \"anatomy US wnl other than non-visualization of outflow tracts, declined referral to MFM for repeat imaging  Repeat growth 2019: 3479grams 82% Hadlock.\"    ROS:  Const: denies fevers, general concerns  CV/resp: reports no concerns  GI: denies abd pain, GI concerns  : see HPI  Neuro: denies HA/vision changes    OB History    Para Term  AB Living   3 2 2     2   SAB TAB Ectopic Molar Multiple Live Births             2      # Outcome Date GA Lbr Leonardo/2nd Weight Sex Delivery Anes PTL Lv   3 Current            2 Term 18 39w0d  3.42 kg (7 lb 8.6 oz) F Vag-Spont   CARISSA   1 Term 07 42w0d  3.941 kg (8 lb 11 oz) M CS-Unspec Spinal N CARISSA      Complications: Failure to Progress in First Stage          GYN: denies STIs, Had unknown stage of cervical dysplasia assc'd w/ high risk HPV 5 years ago diagnosed with colposcopy. Had repeat colpo 6 months after with resolution of dysplasia.    Menarche at 16 years, cycle length ~2weeks lasting 7-10 days    Past Medical History:   Diagnosis Date   • Asthma    • Previous  delivery, antepartum condition or complication 2017   • Supervision of other high risk pregnancy, antepartum 2017   • Thyroid disease     hypo       Past Surgical History:   Procedure Laterality Date   • REPEAT C SECTION Bilateral 2018    Procedure: REPEAT C SECTION;  Surgeon: Dlicia POE" "SERGEY Jackson;  Location: LABOR AND DELIVERY;  Service: Labor and Delivery   • PRIMARY C SECTION             No current facility-administered medications on file prior to encounter.      Current Outpatient Prescriptions on File Prior to Encounter   Medication Sig Dispense Refill   • acetaminophen (TYLENOL) 325 MG Tab Take 650 mg by mouth every four hours as needed.     • LEVOTHYROXINE SODIUM PO Take 0.75 mcg by mouth every morning.     • Prenatal Multivit-Min-Fe-FA (PRENATAL VITAMINS PO) Take  by mouth.         Family medical history: Denies any h/o sudden cardiac death or diabetes    Social History     Social History   • Marital status:      Spouse name: N/A   • Number of children: N/A   • Years of education: N/A     Occupational History   • Not on file.     Social History Main Topics   • Smoking status: Former Smoker     Packs/day: 0.25     Years: 10.00     Types: Cigarettes     Quit date: 2017   • Smokeless tobacco: Never Used   • Alcohol use No   • Drug use: No   • Sexual activity: Yes     Other Topics Concern   • Not on file     Social History Narrative   • No narrative on file     PE:  Vitals:    19 0741 19 0811   BP:  129/82   Pulse:  86   Temp:  36.8 °C (98.2 °F)   Weight: (!) 133.8 kg (295 lb)    Height: 1.651 m (5' 5\")      Gen: AAO, NAD  Abd: soft, gravid, NTND, EFW 4kg  Ext: NT, No edema    SVE: Deferred  FHT: 150/mod variability/positive accels/ negative decels  Loraine: q4-5 minutes    A/P: 33 y.o.  @ 39w0d by first trimester ultrasound here for scheduled repeat  section followed by BTL. + FM. Negative LOF or VB.     - Admit to [facility]  - Repeat Csx w/ Dr. Duarte  - GBS positive but intact, no need for PCN treatmen  - Pain control PRN  - FHT Category 1  - Consents for repeat  section followed by BTL to be signed. Risks and benefits of   - Discussed patient's current status and plan of care/treatment with Dr. Duarte prior to patient's admission. She is aware " and in agreement with plan.    Regarding :  Discussed with the patient the benefits, risks and alternative of  delivery. Risks include but are not limited to pain, infection, bleeding with possible need for transfusion, scarring, damage to surrounding structures.  Specifically organs that can be damaged are bowel, bladder, ureters, and nerves. Dr. Duarte also discussed with the patient the risk of wound infection and wound breakdown. Discussed that these risks are greater in people that have diabetes or obesity. Also discussed the risk of emergency blood transfusion, rare risk of emergency hysterectomy or death.      Regarding BTL:  Counseled patient on the risks, benefits, and alternatives to permanent sterilization with salpingectomy/tubal ligation. Discussed that risks include but are not limited to pain, infection, bleeding with possible need for transfusion, injury to adjacent organs, blood clots, anesthesia complications and death. This is permanent surgical sterilization.  Failure rate is approximately 1% or lower with increased risk of ectopic pregnancy if tubal fails. Patient aware of alternatives including reversible contraceptive methods available to her including OCPs, depo, ring, patch, IUD, nexplanon, condoms. She declines these methods.  She has completed child bearing and desires permanent sterization.     Patient had the opportunity to ask questions. All questions were answered to the patient's satisfaction.  She reported understanding and signed consent      Esther Quach MD, PGY-1  [facility] [employer]  Family Medicine Residency

## 2019-02-01 NOTE — OP REPORT
DATE OF SERVICE:  2019    PREOPERATIVE DIAGNOSES:  1.  Intrauterine pregnancy at 39-0/7th weeks estimated gestational age.  2.  History of previous  x2.  3.  Undesired fertility.    POSTOPERATIVE DIAGNOSES:  1.  Intrauterine pregnancy at 39-0/7th weeks estimated gestational age.  2.  History of previous  x2.  3.  Undesired fertility.    PROCEDURES PERFORMED:  Repeat low transverse  section via Pfannenstiel   and bilateral tubal ligation with Filshie clips.    SURGEON:  Nila Duarte MD    FIRST ASSISTANT:  Esther Quach MD    ANESTHESIA:  Spinal.    COMPLICATIONS:  None.    ESTIMATED BLOOD LOSS:  600 mL    FLUIDS:  1800 mL of LR.    URINE OUTPUT:  200 mL of clear urine.    INDICATIONS FOR PROCEDURE:  This is a 33-year-old  3, para 2-0-0-2, at   39-0/7th weeks' estimated gestational age with a history of 2 previous    sections.  She has completed her childbearing and desires permanent   sterilization.    FINDINGS:  Viable male infant in cephalic presentation with Apgars of 7 and 7,   clear fluid; normal uterus, tubes, and ovaries.  The uterus did have some   omental adhesions to the fundus.    PROCEDURE IN DETAIL:  The patient was taken to the operating room where spinal   anesthesia was found to be adequate.  She was then prepped and draped in the   normal sterile fashion in the dorsal supine position with a leftward hip tilt.    A Pfannenstiel skin incision was then made with the scalpel and carried down   to the level of the underlying layer of fascia, which was nicked in the   midline and extended laterally with Rodriguez scissors.  The superior aspect of the   fascial incision was then grasped with the Kocher clamps, elevated and the   underlying rectus muscles dissected off sharply.  Attention was then turned to   the inferior aspect of this incision, which in a similar fashion was grasped,   tented up with Kocher clamps and the rectus muscles dissected off sharply.     The rectus muscles were  in the midline.  The peritoneum was   identified, tented up and entered sharply with Metzenbaum scissors.  The   peritoneal incision was extended superiorly and inferiorly with good   visualization of the bladder.  The bladder blade was inserted and adhesions   were lysed involving the fundus of the uterus to the omentum.  The   vesicouterine peritoneum was identified, grasped with pickups and entered   sharply with the Metzenbaum scissors, and this incision was extended laterally   and the bladder flap was created digitally.  The bladder blade was then   reinserted and the lower uterine segment incised in a transverse fashion with   the scalpel.  The uterine incision was then extended laterally digitally.  The   bladder blade was removed and the infant's head was delivered atraumatically.    A loose nuchal cord was reduced.  The remainder of the infant was delivered   without difficulty.  The mouth and nose were bulb suctioned on the field.  The   cord was clamped and cut.  The infant was handed to the awaiting attendant.    Cord gases were sent.  The placenta was then removed manually.  The uterus was   exteriorized and cleared of all clot and debris.  The uterine incision was   repaired with 1-0 chromic in a running locked fashion.  Excellent hemostasis   was noted.  Attention was then turned to the fallopian tubes, which were   identified and followed out to the fimbriated end.  The right fallopian tube   was grasped approximately midway down the tubal length with Earl clamp and   a Filshie clip was applied with complete transection of the tube noted and the   clip was placed slightly more lateral than usual due to large vascular   bundles in the mesosalpinx.  The left fallopian tube was then identified and   followed out to the fimbriated end and the tube was grasped with a Earl   clamp.  A Filshie clip was applied approximately 4 cm from the uterine cornua   and the  complete occlusion of the tube was noted.  At this time, the uterine   incision was reinspected and noted to be hemostatic.  The uterus was returned   to the abdomen.  The gutters were cleared of all clot and debris.  The   peritoneum was closed with 3-0 Vicryl in a running fashion.  The fascia was   closed with 0 Vicryl in a running fashion.  The subcuticular fat was   irrigated.  Kimi's layer was closed with interrupted sutures of 3-0 Vicryl   and the skin was closed with staples.  The patient tolerated the procedure   well.  Sponge, lap, and needle counts were correct x3.  The patient was taken   to the recovery room in stable condition.       ____________________________________     Nila Duarte MD AFC / JODI    DD:  02/01/2019 11:43:10  DT:  02/01/2019 12:52:03    D#:  [id number]  Job#:  [id number]

## 2019-02-01 NOTE — PROGRESS NOTES
32yo, , edc2/8, 39 presents for R c/s with BTL. Pt denies LOF, vag bleeding. POS fm. EFM and Gratis placed. VSS.

## 2019-02-01 NOTE — OR SURGEON
Immediate Post OP Note    PreOp Diagnosis: IUP @ 39+0/7 wks with hx of 2 previous C/S, undesired fertility    PostOp Diagnosis: Same    Procedure(s):  REPEAT C SECTION WITH TUBAL LIGATION - Wound Class: Clean Contaminated    Surgeon(s):  Nila Duarte M.D.    Anesthesiologist/Type of Anesthesia:  Anesthesiologist: Shade Amos M.D./Spinal    Surgical Staff:  Circulator: Cuong Alfred R.N.  Scrub Person: Rosa Lorenzo    Specimens removed if any:  * No specimens in log *    Estimated Blood Loss: 600 cc    Findings: viable female infant in cephalic presentation with apgars of 7 and 7, clear fluid.  Uterus with omental adhesions.  Normal tubes and ovaries.     Complications: None.        2/1/2019 10:44 AM Nila Duarte M.D.

## 2019-02-02 PROCEDURE — 700102 HCHG RX REV CODE 250 W/ 637 OVERRIDE(OP): Performed by: OBSTETRICS & GYNECOLOGY

## 2019-02-02 PROCEDURE — 770002 HCHG ROOM/CARE - OB PRIVATE (112)

## 2019-02-02 PROCEDURE — 700102 HCHG RX REV CODE 250 W/ 637 OVERRIDE(OP): Performed by: STUDENT IN AN ORGANIZED HEALTH CARE EDUCATION/TRAINING PROGRAM

## 2019-02-02 PROCEDURE — A9270 NON-COVERED ITEM OR SERVICE: HCPCS | Performed by: STUDENT IN AN ORGANIZED HEALTH CARE EDUCATION/TRAINING PROGRAM

## 2019-02-02 PROCEDURE — A9270 NON-COVERED ITEM OR SERVICE: HCPCS | Performed by: OBSTETRICS & GYNECOLOGY

## 2019-02-02 PROCEDURE — 700102 HCHG RX REV CODE 250 W/ 637 OVERRIDE(OP): Performed by: ANESTHESIOLOGY

## 2019-02-02 PROCEDURE — 700112 HCHG RX REV CODE 229: Performed by: STUDENT IN AN ORGANIZED HEALTH CARE EDUCATION/TRAINING PROGRAM

## 2019-02-02 PROCEDURE — A9270 NON-COVERED ITEM OR SERVICE: HCPCS | Performed by: ANESTHESIOLOGY

## 2019-02-02 PROCEDURE — 700111 HCHG RX REV CODE 636 W/ 250 OVERRIDE (IP): Performed by: ANESTHESIOLOGY

## 2019-02-02 RX ORDER — METOCLOPRAMIDE HYDROCHLORIDE 5 MG/ML
10 INJECTION INTRAMUSCULAR; INTRAVENOUS EVERY 6 HOURS PRN
Status: DISCONTINUED | OUTPATIENT
Start: 2019-02-02 | End: 2019-02-03 | Stop reason: HOSPADM

## 2019-02-02 RX ORDER — IBUPROFEN 600 MG/1
600 TABLET ORAL EVERY 6 HOURS PRN
Status: DISCONTINUED | OUTPATIENT
Start: 2019-02-02 | End: 2019-02-03 | Stop reason: HOSPADM

## 2019-02-02 RX ORDER — HYDROCODONE BITARTRATE AND ACETAMINOPHEN 5; 325 MG/1; MG/1
1 TABLET ORAL EVERY 4 HOURS PRN
Status: DISCONTINUED | OUTPATIENT
Start: 2019-02-02 | End: 2019-02-03 | Stop reason: HOSPADM

## 2019-02-02 RX ORDER — ONDANSETRON 4 MG/1
4 TABLET, ORALLY DISINTEGRATING ORAL EVERY 6 HOURS PRN
Status: DISCONTINUED | OUTPATIENT
Start: 2019-02-02 | End: 2019-02-03 | Stop reason: HOSPADM

## 2019-02-02 RX ORDER — ONDANSETRON 2 MG/ML
4 INJECTION INTRAMUSCULAR; INTRAVENOUS EVERY 6 HOURS PRN
Status: DISCONTINUED | OUTPATIENT
Start: 2019-02-02 | End: 2019-02-03 | Stop reason: HOSPADM

## 2019-02-02 RX ORDER — ACETAMINOPHEN 325 MG/1
325 TABLET ORAL EVERY 4 HOURS PRN
Status: DISCONTINUED | OUTPATIENT
Start: 2019-02-02 | End: 2019-02-03 | Stop reason: HOSPADM

## 2019-02-02 RX ORDER — HYDROCODONE BITARTRATE AND ACETAMINOPHEN 10; 325 MG/1; MG/1
1 TABLET ORAL EVERY 4 HOURS PRN
Status: DISCONTINUED | OUTPATIENT
Start: 2019-02-02 | End: 2019-02-03 | Stop reason: HOSPADM

## 2019-02-02 RX ADMIN — KETOROLAC TROMETHAMINE 30 MG: 30 INJECTION, SOLUTION INTRAMUSCULAR; INTRAVENOUS at 11:30

## 2019-02-02 RX ADMIN — DOCUSATE SODIUM 100 MG: 100 CAPSULE, LIQUID FILLED ORAL at 14:33

## 2019-02-02 RX ADMIN — KETOROLAC TROMETHAMINE 30 MG: 30 INJECTION, SOLUTION INTRAMUSCULAR; INTRAVENOUS at 04:40

## 2019-02-02 RX ADMIN — Medication 1 TABLET: at 04:50

## 2019-02-02 RX ADMIN — LEVOTHYROXINE SODIUM 75 MCG: 75 TABLET ORAL at 04:50

## 2019-02-02 RX ADMIN — ACETAMINOPHEN 1000 MG: 500 TABLET ORAL at 11:30

## 2019-02-02 RX ADMIN — HYDROCODONE BITARTRATE AND ACETAMINOPHEN 1 TABLET: 5; 325 TABLET ORAL at 14:33

## 2019-02-02 RX ADMIN — ACETAMINOPHEN 1000 MG: 500 TABLET ORAL at 01:35

## 2019-02-02 RX ADMIN — HYDROCODONE BITARTRATE AND ACETAMINOPHEN 1 TABLET: 5; 325 TABLET ORAL at 19:53

## 2019-02-02 RX ADMIN — IBUPROFEN 600 MG: 600 TABLET ORAL at 17:56

## 2019-02-02 ASSESSMENT — EDINBURGH POSTNATAL DEPRESSION SCALE (EPDS)
I HAVE BEEN ANXIOUS OR WORRIED FOR NO GOOD REASON: HARDLY EVER
I HAVE LOOKED FORWARD WITH ENJOYMENT TO THINGS: AS MUCH AS I EVER DID
I HAVE BEEN SO UNHAPPY THAT I HAVE HAD DIFFICULTY SLEEPING: NOT AT ALL
THINGS HAVE BEEN GETTING ON TOP OF ME: NO, MOST OF THE TIME I HAVE COPED QUITE WELL
I HAVE BEEN SO UNHAPPY THAT I HAVE BEEN CRYING: NO, NEVER
I HAVE BEEN ABLE TO LAUGH AND SEE THE FUNNY SIDE OF THINGS: AS MUCH AS I ALWAYS COULD
I HAVE BLAMED MYSELF UNNECESSARILY WHEN THINGS WENT WRONG: YES, SOME OF THE TIME
I HAVE FELT SCARED OR PANICKY FOR NO GOOD REASON: YES, SOMETIMES
I HAVE FELT SAD OR MISERABLE: NO, NOT AT ALL
THE THOUGHT OF HARMING MYSELF HAS OCCURRED TO ME: NEVER

## 2019-02-02 NOTE — CARE PLAN
Problem: Alteration in comfort related to surgical incision and/or after birth pains  Goal: Patient is able to ambulate, care for self and infant with acceptable pain level  Outcome: PROGRESSING AS EXPECTED  Patient is able to care for self and baby and ambulate with tolerable level of pain. Will medicate according to 0-10 pain scale when medications are available.

## 2019-02-02 NOTE — PROGRESS NOTES
Obstetrics & Gynecology Post-Delivery Progress Note    Date of Service  2019    33 y.o.  1 s/p , Low Transverse and BTL  Delivery date: 19  Breastfeeding: Yes    Events  No events    Subjective  Pain: No  Bleeding: lochia minimal  PO's: taking regular diet  Voiding: without difficulty  Ambulating: yes  Feeding: breastfeeding well    Objective  Temp:  [36.2 °C (97.1 °F)-37 °C (98.6 °F)] 36.8 °C (98.3 °F)  Pulse:  [63-87] 74  Resp:  [16-20] 17  BP: ()/(58-82) 118/63  SpO2:  [95 %-98 %] 96 %    Physical Exam  General: well and resting   Chest: cTA  CVS:RRR  Chest/Breasts: nipples intact   Abd: soft, ND, NL BS, appropriately tender  Fundus: firm  Incision: dressing clean, dry, intact  Perineum: deferred  Extremities: symmetric and no edema    Lab Results   Component Value Date    RBC 4.21 2019    ABOGROUP O 2018    RH POS 2018     Immunization History   Administered Date(s) Administered   • Tdap Vaccine 2018       Assessment/Plan  Chey Guo is a 33 y.o.  postop day 1 s/p , Low Transverse with BTL.    1. Post care: meeting all goals  2. Hemodynamics: stable  3. Pain: controlled  4. PNL:   Lab Results   Component Value Date    RH POS 2018    RUBELLAIGG 35.40 2018     5. Method of Feeding: plans to breastfeed  6. Method of Contraception: tubal ligation (already done)  7. Vaccinations:   Immunization History   Administered Date(s) Administered   • Tdap Vaccine 2018     8. Disposition: likely home postop day 3    No new Assessment & Plan notes have been filed under this hospital service since the last note was generated.  Service: Obstetrics & Gynecology       VTE prophylaxis: none. pt ambulating    Wesley Sow M.D.

## 2019-02-02 NOTE — CARE PLAN
Problem: Safety  Goal: Will remain free from injury  Outcome: PROGRESSING AS EXPECTED  Patient educated on fall prevention and to call for assistance prior to getting out of bed, patient reports understanding.     Problem: Pain Management  Goal: Pain level will decrease to patient's comfort goal  Outcome: PROGRESSING AS EXPECTED  Patient reports mild to moderate pain, reports relief with PRN and schedule pain medications.

## 2019-02-02 NOTE — LACTATION NOTE
Initial visit. Baby at breast swaddled, chin not touching breast, MOB c/o discomfort with suckling.  MOB taught to pull baby in close to breast so chin is touching, deep latch obtained and more comfortable for MOB. Encouraged to unwrap baby and do continuous skin to skin care while MOB is awake. Encouraged to feed on cue, cues reviewed, and keep skin to skin for deeper latch during feedings. Discussed what to expect in feeding frequency for next 48 hours.  MOB reports she  previous children without difficulty.  She is concerned this baby is sleeping too much.Discussed normal  behaviors for 1st 24 hours and then day 2 and 3.  Reiterated skin to skin care and watching for early hunger cues.

## 2019-02-02 NOTE — CONSULTS
Initial visit.  MOB has had previous BF experience. Reviewed normal  behaviors and normal course of breastfeeding at 12-24-48-72 hours, and what to expect. Discussed importance of offering breast every 2-3 hours, and even if infant shows no interest, can do hand expression into infant's lips.Showed MOB how to do hand expresssion. MOB feels comfortable to do HE independently. Encouraged to continue doing skin to skin. Discussed signs of a good latch, voiding and stooling patterns, feeding cues, stomach size, and importance of establishing milk supply with frequency of feedings.  New Beginning booklet given, and breastfeeding content reviewed.   Plan for tonight is to continue to offer breast first, if not latching well, can hand express colostrum, and refeed by spoon.       No latch observed at this time.  Encouraged to call for support as needed. Lactation Connection info and phone number given, invited to breastfeeding circles. SERAFIN has [employer], and she is also aware she can get breastfeeding support from them.    Encouraged to call for support as needed.

## 2019-02-02 NOTE — PROGRESS NOTES
Pt arrived via gurney with belongings to room S351. Report received from Cuong Alfred RN. Pt oriented to room, call light. Assessment done. Fundus firm, lochia light. Vital signs stable. IV infusing 125 of pitocin in left wrist. Pt states pain is tolerable, see MAR. Reviewed plan of care with pt & encouraged to call with needs or prior to ambulating. Call light in place. Will continue to monitor.

## 2019-02-02 NOTE — CARE PLAN
Problem: Potential for postpartum infection related to surgical incision, compromised uterine condition, urinary tract or respiratory compromise  Goal: Patient will be afebrile and free from signs and symptoms of infection  Outcome: PROGRESSING AS EXPECTED  Patient remains afebrile and shows no s/s of infection. Will continue to monitor with hourly rounding.

## 2019-02-03 VITALS
HEIGHT: 65 IN | OXYGEN SATURATION: 98 % | SYSTOLIC BLOOD PRESSURE: 136 MMHG | TEMPERATURE: 98.1 F | RESPIRATION RATE: 17 BRPM | WEIGHT: 293 LBS | HEART RATE: 86 BPM | BODY MASS INDEX: 48.82 KG/M2 | DIASTOLIC BLOOD PRESSURE: 82 MMHG

## 2019-02-03 PROCEDURE — 700102 HCHG RX REV CODE 250 W/ 637 OVERRIDE(OP): Performed by: STUDENT IN AN ORGANIZED HEALTH CARE EDUCATION/TRAINING PROGRAM

## 2019-02-03 PROCEDURE — A9270 NON-COVERED ITEM OR SERVICE: HCPCS | Performed by: OBSTETRICS & GYNECOLOGY

## 2019-02-03 PROCEDURE — A9270 NON-COVERED ITEM OR SERVICE: HCPCS | Performed by: STUDENT IN AN ORGANIZED HEALTH CARE EDUCATION/TRAINING PROGRAM

## 2019-02-03 PROCEDURE — 700112 HCHG RX REV CODE 229: Performed by: STUDENT IN AN ORGANIZED HEALTH CARE EDUCATION/TRAINING PROGRAM

## 2019-02-03 PROCEDURE — 700102 HCHG RX REV CODE 250 W/ 637 OVERRIDE(OP): Performed by: OBSTETRICS & GYNECOLOGY

## 2019-02-03 RX ORDER — DOCUSATE SODIUM 100 MG/1
100 CAPSULE, LIQUID FILLED ORAL 2 TIMES DAILY
Qty: 60 CAP | Refills: 2 | Status: SHIPPED | OUTPATIENT
Start: 2019-02-03 | End: 2019-04-07

## 2019-02-03 RX ORDER — HYDROCODONE BITARTRATE AND ACETAMINOPHEN 5; 325 MG/1; MG/1
1 TABLET ORAL EVERY 6 HOURS PRN
Qty: 30 TAB | Refills: 0 | Status: SHIPPED | OUTPATIENT
Start: 2019-02-03 | End: 2019-02-10

## 2019-02-03 RX ORDER — IBUPROFEN 600 MG/1
600 TABLET ORAL EVERY 6 HOURS PRN
Qty: 30 TAB | Refills: 3 | Status: SHIPPED | OUTPATIENT
Start: 2019-02-03

## 2019-02-03 RX ADMIN — HYDROCODONE BITARTRATE AND ACETAMINOPHEN 1 TABLET: 5; 325 TABLET ORAL at 00:02

## 2019-02-03 RX ADMIN — HYDROCODONE BITARTRATE AND ACETAMINOPHEN 1 TABLET: 5; 325 TABLET ORAL at 08:25

## 2019-02-03 RX ADMIN — LEVOTHYROXINE SODIUM 75 MCG: 75 TABLET ORAL at 06:19

## 2019-02-03 RX ADMIN — IBUPROFEN 600 MG: 600 TABLET ORAL at 00:02

## 2019-02-03 RX ADMIN — DOCUSATE SODIUM 100 MG: 100 CAPSULE, LIQUID FILLED ORAL at 08:25

## 2019-02-03 RX ADMIN — Medication 1 TABLET: at 06:19

## 2019-02-03 RX ADMIN — HYDROCODONE BITARTRATE AND ACETAMINOPHEN 1 TABLET: 5; 325 TABLET ORAL at 04:33

## 2019-02-03 RX ADMIN — IBUPROFEN 600 MG: 600 TABLET ORAL at 06:19

## 2019-02-03 NOTE — CARE PLAN
Problem: Potential anxiety related to difficulty adapting to parental role  Goal: Patient will verbalize and demonstrate effective bonding and parenting behavior  Outcome: PROGRESSING AS EXPECTED  Patient is bonding well with baby, recognizing cues, and responding appropriately.

## 2019-02-03 NOTE — CARE PLAN
Problem: Pain Management  Goal: Pain level will decrease to patient's comfort goal  Outcome: PROGRESSING AS EXPECTED  Patient reports mild to moderate pain, reports relief with PRN pain medication.     Problem: Altered physiologic condition related to postoperative  delivery  Goal: Patient physiologically stable as evidenced by normal lochia, palpable uterine involution and vital signs within normal limits  Outcome: PROGRESSING AS EXPECTED  Fundus firm, lochia light, VSS.  Will continue to monitor.

## 2019-02-03 NOTE — PROGRESS NOTES
Discharge teaching reviewed with patient, all questions answered. Pt given all written information on self and infant care, including prescriptions and follow-up instructions. Pt doing well with infant, and feels comfortable with her feeding plan.  Discharged to home at 1307. Escorted out in wheelchair by staff.

## 2019-02-03 NOTE — DISCHARGE SUMMARY
Discharge Summary:      Chey Guo      Admit Date:   2019  Discharge Date:  2/3/2019     Admitting diagnosis:  Pregnancy  REPEAT C SECTION WITH TUBAL LIGATION  Pregnancy  Discharge Diagnosis: Status post  for repeat.  Pregnancy Complications: none  Tubal Ligation:  yes        History:  Past Medical History:   Diagnosis Date   • Asthma    • Previous  delivery, antepartum condition or complication 2017   • Supervision of other high risk pregnancy, antepartum 2017   • Thyroid disease     hypo     OB History    Para Term  AB Living   3 3 3     3   SAB TAB Ectopic Molar Multiple Live Births           0 3      # Outcome Date GA Lbr Leonardo/2nd Weight Sex Delivery Anes PTL Lv   3 Term 19 39w0d  3.72 kg (8 lb 3.2 oz) M CS-LTranv Spinal N CARISSA   2 Term 18 39w0d  3.42 kg (7 lb 8.6 oz) F Vag-Spont   CARISSA   1 Term 07 42w0d  3.941 kg (8 lb 11 oz) M CS-Unspec Spinal N CARISSA      Complications: Failure to Progress in First Stage           Patient has no known allergies.  Patient Active Problem List    Diagnosis Date Noted   • Encounter for supervision of high risk pregnancy in third trimester, antepartum 2019   • 30 weeks gestation of pregnancy 2018   • History of  delivery 2018   • Other specified hypothyroidism 2018        Hospital Course:   33 y.o.  , was admitted with the above mentioned diagnosis, underwent Repeat  repeat, BTL. Patient postpartum course was unremarkable, with progressive advancement in diet , ambulation and toleration of oral analgesia. Patient without complaints today and desires discharge.      Vitals:    19 0400 19 0835 19 2000 19 0700   BP: 118/63 121/64 140/79 136/82   Pulse: 74 78 88 86   Resp: 17 18 16 17   Temp: 36.8 °C (98.3 °F) 37.2 °C (98.9 °F) 37.1 °C (98.7 °F) 36.7 °C (98.1 °F)   TempSrc: Temporal Temporal Temporal Temporal   SpO2: 96% 94% 97% 98%   Weight:        Height:           Current Facility-Administered Medications   Medication Dose   • acetaminophen (TYLENOL) tablet 325 mg  325 mg   • ibuprofen (MOTRIN) tablet 600 mg  600 mg   • HYDROcodone-acetaminophen (NORCO) 5-325 MG per tablet 1 Tab  1 Tab   • HYDROcodone/acetaminophen (NORCO)  MG per tablet 1 Tab  1 Tab   • ondansetron (ZOFRAN) syringe/vial injection 4 mg  4 mg    Or   • ondansetron (ZOFRAN ODT) dispertab 4 mg  4 mg   • metoclopramide (REGLAN) injection 10 mg  10 mg   • levothyroxine (SYNTHROID) tablet 75 mcg  75 mcg   • LR infusion     • PRN oxytocin (PITOCIN) (20 Units/1000 mL) PRN for excessive uterine bleeding - See Admin Instr  125-999 mL/hr   • miSOPROStol (CYTOTEC) tablet 600 mcg  600 mcg   • methylergonovine (METHERGINE) injection 0.2 mg  0.2 mg   • carboPROST (HEMABATE) injection 250 mcg  250 mcg   • docusate sodium (COLACE) capsule 100 mg  100 mg   • bisacodyl (DULCOLAX) suppository 10 mg  10 mg   • magnesium hydroxide (MILK OF MAGNESIA) suspension 30 mL  30 mL   • prenatal plus vitamin (STUARTNATAL 1+1) 27-1 MG tablet 1 Tab  1 Tab   • simethicone (MYLICON) chewable tab 80 mg  80 mg       Exam:  Breast Exam: negative  Abdomen: Abdomen soft, non-tender. BS normal. No masses,  No organomegaly  Fundus Non Tender: yes  Incision: dry and intact  Perineum: perineum intact  Extremity: extremities, peripheral pulses and reflexes normal     Labs:  Recent Labs      02/01/19   0800  02/01/19   1917   WBC  8.5  16.0*   RBC  4.59  4.21   HEMOGLOBIN  12.8  11.5*   HEMATOCRIT  39.1  35.7*   MCV  85.2  84.8   MCH  27.9  27.3   MCHC  32.7*  32.2*   RDW  43.8  43.2   PLATELETCT  250  245   MPV  11.1  10.6        Activity:   Discharge to home  Pelvic Rest x 6 weeks    Assessment:  normal postpartum course  Discharge Assessment: Taking adequate diet and fluids     Follow up: [facility] in 2 days for staple removal     Discharge Meds:   Current Outpatient Prescriptions   Medication Sig Dispense Refill    • HYDROcodone-acetaminophen (NORCO) 5-325 MG Tab per tablet Take 1 Tab by mouth every 6 hours as needed (for after delivery) for up to 7 days. 30 Tab 0   • ibuprofen (MOTRIN) 600 MG Tab Take 1 Tab by mouth every 6 hours as needed. 30 Tab 3   • docusate sodium (COLACE) 100 MG Cap Take 1 Cap by mouth 2 times a day. 60 Cap 2       Tosin Lu M.D.

## 2019-02-03 NOTE — DISCHARGE INSTRUCTIONS
POSTPARTUM DISCHARGE INSTRUCTIONS FOR MOM    YOB: 1985   Age: 33 y.o.               Admit Date: 2019     Discharge Date: 2/3/2019  Attending Doctor:  Nila Duarte M.D.                  Allergies:  Patient has no known allergies.    Discharged to home by car. Discharged via wheelchair, hospital escort: Yes.  Special equipment needed: Not Applicable  Belongings with: Personal  Be sure to schedule a follow-up appointment with your primary care doctor or any specialists as instructed.     Discharge Plan:   Diet Plan: Discussed  Activity Level: Discussed  Confirmed Follow up Appointment: Patient to Call and Schedule Appointment  Confirmed Symptoms Management: Discussed  Medication Reconciliation Updated: Yes  Influenza Vaccine Indication: Patient Refuses    REASONS TO CALL YOUR OBSTETRICIAN:  1.   Persistent fever or shaking chills (Temperature higher than 100.4)  2.   Heavy bleeding (soaking more than 1 pad per hour); Passing clots  3.   Foul odor from vagina  4.   Mastitis (Breast infection; breast pain, chills, fever, redness)  5.   Urinary pain, burning or frequency  6.   Episiotomy infection  7.   Abdominal incision infection  8.   Severe depression longer than 24 hours    HAND WASHING  · Prior to handling the baby.  · Before breastfeeding or bottle feeding baby.  · After using the bathroom or changing the baby's diaper.    WOUND CARE  Ask your physician for additional care instructions.  In general:    ·  Incision:      · Keep clean and dry.    · Do NOT lift anything heavier than your baby for up to 6 weeks.    · There should not be any opening or pus.      VAGINAL CARE  · Nothing inside vagina for 6 weeks: no sexual intercourse, tampons or douching.  · Bleeding may continue for 2-4 weeks.  Amount may vary.    · Call your physician for heavy bleeding which means soaking more than 1 pad per hour    BIRTH CONTROL  · It is possible to become pregnant at any time after delivery and while  "breastfeeding.  · Plan to discuss a method of birth control with your physician at your follow up visit. visit.    DIET AND ELIMINATION  · Eating more fiber (bran cereal, fruits, and vegetables) and drinking plenty of fluids will help to avoid constipation.  · Urinary frequency after childbirth is normal.    POSTPARTUM BLUES  During the first few days after birth, you may experience a sense of the \"blues\" which may include impatience, irritability or even crying.  These feeling come and go quickly.  However, as many as 1 in 10 women experience emotional symptoms known as postpartum depression.    Postpartum depression:  May start as early as the second or third day after delivery or take several weeks or months to develop.  Symptoms of \"blues\" are present, but are more intense:  Crying spells; loss of appetite; feelings of hopelessness or loss of control; fear of touching the baby; over concern or no concern at all about the baby; little or no concern about your own appearance/caring for yourself; and/or inability to sleep or excessive sleeping.  Contact your physician if you are experiencing any of these symptoms.    Crisis Hotline:  · [place] Crisis Hotline:  5-928-REOICTW  Or 1-800.835.9006  · [place] Crisis Hotline:  1-179.294.1728  Or 039-250-4506    PREVENTING SHAKEN BABY:  If you are angry or stressed, PUT THE BABY IN THE CRIB, step away, take some deep breaths, and wait until you are calm to care for the baby.  DO NOT SHAKE THE BABY.  You are not alone, call a supporter for help.    · Crisis Call Center 24/7 crisis line 721-074-2228 or 1-759.600.1385  · You can also text them, text \"ANSWER\" to 908064    QUIT SMOKING/TOBACCO USE:  I understand the use of any tobacco products increases my chance of suffering from future heart disease and could cause other illnesses which may shorten my life. Quitting the use of tobacco products is the single most important thing I can do to improve my health. For further " information on smoking / tobacco cessation call a Toll Free Quit Line at 1-611.515.4436 (*[employer] [place]) or 1-201.701.1937 (American Lung Association) or you can access the web based program at www.lungusa.org.    · [place] Tobacco Users Help Line:  (943) 633-1930       Toll Free: 1-165.815.8092  · Quit Tobacco Program [facility] (188)854-2188    DEPRESSION / SUICIDE RISK:  As you are discharged from this [facility], it is important to learn how to keep safe from harming yourself.    Recognize the warning signs:  · Abrupt changes in personality, positive or negative- including increase in energy   · Giving away possessions  · Change in eating patterns- significant weight changes-  positive or negative  · Change in sleeping patterns- unable to sleep or sleeping all the time   · Unwillingness or inability to communicate  · Depression  · Unusual sadness, discouragement and loneliness  · Talk of wanting to die  · Neglect of personal appearance   · Rebelliousness- reckless behavior  · Withdrawal from people/activities they love  · Confusion- inability to concentrate     If you or a loved one observes any of these behaviors or has concerns about self-harm, here's what you can do:  · Talk about it- your feelings and reasons for harming yourself  · Remove any means that you might use to hurt yourself (examples: pills, rope, extension cords, firearm)  · Get professional help from the community (Mental Health, Substance Abuse, psychological counseling)  · Do not be alone:Call your Safe Contact- someone whom you trust who will be there for you.  · Call your local CRISIS HOTLINE 084-6890 or 832-325-8775  · Call your local Children's Mobile Crisis Response Team [place] (821) 691-7637 or www.LxDATA  · Call the toll free National Suicide Prevention Hotlines   · National Suicide Prevention Lifeline 689-518-FRKG (5149)  · National Hope Line Network 800-SUICIDE  (828-6793)    DISCHARGE SURVEY:  Thank you for choosing [facility].  We hope we provided you with very good care.  You may be receiving a survey in the mail.  Please fill it out.  Your opinion is valuable to us.    ADDITIONAL EDUCATIONAL MATERIALS GIVEN TO PATIENT:  Pelvic rest   No heavy lifting   Pain bleeding fever precautions   PNV   Continue to breast feed    My signature on this form indicates that:  1.  I have reviewed and understand the above information  2.  My questions regarding this information have been answered to my satisfaction.  3.  I have formulated a plan with my discharge nurse to obtain my prescribed medication for home.

## 2019-02-03 NOTE — LACTATION NOTE
Mother is getting ready to discharge today, she states that she is not having any issues with breast feeding, her milk is coming in, no breast/nipple pain or tenderness. Encouraged her to call if she does need any BF assistance, states that none is needed at this time.

## 2019-02-03 NOTE — CARE PLAN
Problem: Alteration in comfort related to surgical incision and/or after birth pains  Goal: Patient verbalizes acceptable pain level  Outcome: PROGRESSING AS EXPECTED  Patient states pain is tolerable with pain medication.  Will continue to reassess with rounding and medicate accordingly to 0-10 pain scale.

## 2019-02-05 ENCOUNTER — NON-PROVIDER VISIT (OUTPATIENT)
Dept: OBGYN | Facility: MEDICAL CENTER | Age: 34
End: 2019-02-05
Payer: OTHER GOVERNMENT

## 2019-02-05 ENCOUNTER — TELEPHONE (OUTPATIENT)
Dept: OBGYN | Facility: CLINIC | Age: 34
End: 2019-02-05

## 2019-02-05 VITALS — SYSTOLIC BLOOD PRESSURE: 120 MMHG | DIASTOLIC BLOOD PRESSURE: 70 MMHG

## 2019-02-05 NOTE — NON-PROVIDER
Pt presents for Staple removal today. Pt is S/P LTCS 2/1/2019. Baby boy was 8# 3 oz and doing well, breastfeeding without difficulty. #14 staples were removed without difficulty and area is clean and dry. Steri-strips were applied and incision care is discussed.Pt also given post-op precautions for bilateral LE swelling. /70 and pt understands to be seen emergently with SOB,CP, Dizzyness or significant unilateral swelling. Pt has post-op incision check in 1 week and she agrees to call sooner PRN any questions or concerns. tmm

## 2019-02-12 ENCOUNTER — POST PARTUM (OUTPATIENT)
Dept: OBGYN | Facility: MEDICAL CENTER | Age: 34
End: 2019-02-12
Payer: OTHER GOVERNMENT

## 2019-02-12 VITALS — WEIGHT: 286.6 LBS | DIASTOLIC BLOOD PRESSURE: 80 MMHG | BODY MASS INDEX: 47.69 KG/M2 | SYSTOLIC BLOOD PRESSURE: 125 MMHG

## 2019-02-12 DIAGNOSIS — Z48.89 POSTOPERATIVE VISIT: ICD-10-CM

## 2019-02-12 PROCEDURE — 99024 POSTOP FOLLOW-UP VISIT: CPT | Performed by: OBSTETRICS & GYNECOLOGY

## 2019-02-12 ASSESSMENT — EDINBURGH POSTNATAL DEPRESSION SCALE (EPDS)
I HAVE BEEN SO UNHAPPY THAT I HAVE BEEN CRYING: ONLY OCCASIONALLY
I HAVE FELT SCARED OR PANICKY FOR NO GOOD REASON: NO, NOT MUCH
I HAVE BLAMED MYSELF UNNECESSARILY WHEN THINGS WENT WRONG: NOT VERY OFTEN
THE THOUGHT OF HARMING MYSELF HAS OCCURRED TO ME: NEVER
I HAVE BEEN ABLE TO LAUGH AND SEE THE FUNNY SIDE OF THINGS: AS MUCH AS I ALWAYS COULD
I HAVE BEEN SO UNHAPPY THAT I HAVE HAD DIFFICULTY SLEEPING: NOT AT ALL
TOTAL SCORE: 7
I HAVE BEEN ANXIOUS OR WORRIED FOR NO GOOD REASON: YES, SOMETIMES
THINGS HAVE BEEN GETTING ON TOP OF ME: YES, SOMETIMES I HAVEN'T BEEN COPING AS WELL AS USUAL
I HAVE LOOKED FORWARD WITH ENJOYMENT TO THINGS: AS MUCH AS I EVER DID
I HAVE FELT SAD OR MISERABLE: NO, NOT AT ALL

## 2019-02-12 NOTE — LETTER
February 12, 2019       Patient: Chey Guo   YOB: 1985   Date of Visit: 2/12/2019         To Whom It May Concern:    It is my medical opinion that Chey Guo may return to work on 2/18/19 with the following restrictions.No lifting,pushing or pulling>30lbs.    If you have any questions or concerns, please don't hesitate to call [phone]          Sincerely,          [place] M.D.  Electronically Signed

## 2019-02-12 NOTE — PROGRESS NOTES
Chief complaint: Follow-up after surgery    SUBJECTIVE:  Chey Guo presents to the clinic 2 weeks following RLTCS with BTL    Doing well, no issues.    Breastfeeding    Pt wants to go back to work if possible.  No complaints    Eating a regular diet without difficulty.   Bowel movement are Normal.    The patient is not having any pain.   Spotting.   Patient Denies Incisional pain, drainage or redness    OBJECTIVE:  /80   Wt (!) 130 kg (286 lb 9.6 oz)   LMP 03/21/2018   BMI 47.69 kg/m²   Current Outpatient Prescriptions on File Prior to Visit   Medication Sig Dispense Refill   • ibuprofen (MOTRIN) 600 MG Tab Take 1 Tab by mouth every 6 hours as needed. 30 Tab 3   • docusate sodium (COLACE) 100 MG Cap Take 1 Cap by mouth 2 times a day. 60 Cap 2   • acetaminophen (TYLENOL) 325 MG Tab Take 650 mg by mouth every four hours as needed.     • LEVOTHYROXINE SODIUM PO Take 0.75 mcg by mouth every morning.     • Prenatal Multivit-Min-Fe-FA (PRENATAL VITAMINS PO) Take  by mouth.       No current facility-administered medications on file prior to visit.        Constitutional:  alert, healthy, no distress.  Abdomen:  soft, bowel sounds active, non-tender, non-distended.  Incision:  healing well, no drainage, no erythema, no hernia, no seroma, no swelling, no dehiscence, incision well approximated.    IMPRESSION: Doing well postoperatively.  Pt is to increase activities as tolerated.    Lab:   Recent Results (from the past 1008 hour(s))   GRP B STREP, BY PCR (BRICE BROTH)    Collection Time: 01/04/19  2:38 PM   Result Value Ref Range    Strep Gp B DNA PCR POSITIVE (A) Negative   HOLD BLOOD BANK SPECIMEN (NOT TESTED)    Collection Time: 02/01/19  8:00 AM   Result Value Ref Range    Holding Tube - Bb DONE    CBC WITH DIFFERENTIAL    Collection Time: 02/01/19  8:00 AM   Result Value Ref Range    WBC 8.5 4.8 - 10.8 K/uL    RBC 4.59 4.20 - 5.40 M/uL    Hemoglobin 12.8 12.0 - 16.0 g/dL    Hematocrit 39.1 37.0 - 47.0 %     MCV 85.2 81.4 - 97.8 fL    MCH 27.9 27.0 - 33.0 pg    MCHC 32.7 (L) 33.6 - 35.0 g/dL    RDW 43.8 35.9 - 50.0 fL    Platelet Count 250 164 - 446 K/uL    MPV 11.1 9.0 - 12.9 fL    Neutrophils-Polys 71.10 44.00 - 72.00 %    Lymphocytes 20.00 (L) 22.00 - 41.00 %    Monocytes 6.90 0.00 - 13.40 %    Eosinophils 1.10 0.00 - 6.90 %    Basophils 0.20 0.00 - 1.80 %    Immature Granulocytes 0.70 0.00 - 0.90 %    Nucleated RBC 0.00 /100 WBC    Neutrophils (Absolute) 6.06 2.00 - 7.15 K/uL    Lymphs (Absolute) 1.71 1.00 - 4.80 K/uL    Monos (Absolute) 0.59 0.00 - 0.85 K/uL    Eos (Absolute) 0.09 0.00 - 0.51 K/uL    Baso (Absolute) 0.02 0.00 - 0.12 K/uL    Immature Granulocytes (abs) 0.06 0.00 - 0.11 K/uL    NRBC (Absolute) 0.00 K/uL   CBC without differential    Collection Time: 02/01/19  7:17 PM   Result Value Ref Range    WBC 16.0 (H) 4.8 - 10.8 K/uL    RBC 4.21 4.20 - 5.40 M/uL    Hemoglobin 11.5 (L) 12.0 - 16.0 g/dL    Hematocrit 35.7 (L) 37.0 - 47.0 %    MCV 84.8 81.4 - 97.8 fL    MCH 27.3 27.0 - 33.0 pg    MCHC 32.2 (L) 33.6 - 35.0 g/dL    RDW 43.2 35.9 - 50.0 fL    Platelet Count 245 164 - 446 K/uL    MPV 10.6 9.0 - 12.9 fL       PLAN:  Continue any current medications.  (See Med List for details.)    Pt adamant she wants to go back to work.  Release given. Lifting restrictions d/w pt    Incision healing well, no signs of infection    EPDS score 7.  D/w pt signs and symptoms of PPD.    Return to clinic: in 4 week(s).

## 2019-02-12 NOTE — PROGRESS NOTES
Pt presents for 2 wk pp incision check.Pt is s/p ltcs 2/1 and doing well. Swelling is much improved and she states only very light bleeding.[place] scale=7.

## 2019-02-22 PROBLEM — Z48.89 POSTOPERATIVE VISIT: Status: ACTIVE | Noted: 2019-02-22

## 2019-07-25 NOTE — ADDENDUM NOTE
Encounter addended by: Margy Farooq R.N. on: 7/25/2019 12:43 PM<BR>    Actions taken: Flowsheet accepted

## 2021-05-02 NOTE — TELEPHONE ENCOUNTER
"Pt states she figured it out after calling me back twice and speaking to the\"  girl\". I had no idea that she called back but the form is scanned into her media.  "
----- Message from Dimitri Cook sent at 11/15/2017  4:45 PM PST -----  Regarding: FW:  paper work   Contact: [phone]  This patient has come questions about the Vback paper work that was given to her on her last visit  ----- Message -----  From: Dahiana Benjamin  Sent: 11/15/2017   4:27 PM  To: Dimitri Cook  Subject:  paper work                             Patient called she has some questions about the paper work that was given to her for the . Please call back thank you!    
Called and left a message for the pt to call back  
Called and left message for the pt to call back  
shortness of breath

## 2024-07-08 NOTE — PROGRESS NOTES
Baby is under the oxygen farfan in the NBN.  Discussed pumping with patient, but she didn't feel like she needed to at this time.   Severe

## (undated) DEVICE — SUTURE 3-0 VICRYL PLUS CT-1 - 36 INCH (36/BX)

## (undated) DEVICE — SUTURE 1 CHROMIC CTX ETHICON - (36PK/BX)

## (undated) DEVICE — SHEATH RO 4F 10CM (10EA/BX)

## (undated) DEVICE — WATER IRRIG. STER. 1500 ML - (9/CA)

## (undated) DEVICE — SUTURE 0 VICRYL PLUS CT 36 (36PK/BX)"

## (undated) DEVICE — SUTURE 2-0 CHROMIC GUT CT-1 27 (36PK/BX)"

## (undated) DEVICE — GLOVE BIOGEL SZ 8 SURGICAL PF LTX - (50PR/BX 4BX/CA)

## (undated) DEVICE — SET EXTENSION WITH 2 PORTS (48EA/CA) ***PART #2C8610 IS A SUBSTITUTE*****

## (undated) DEVICE — SLEEVE, SEQUENTIAL CALF REG

## (undated) DEVICE — TRAY SPINAL ANESTHESIA NON-SAFETY (10/CA)

## (undated) DEVICE — DRESSING INTERCEED ABSORBABLE ADHESION BARRIER TC7 (10EA/CA)

## (undated) DEVICE — KIT  I.V. START (100EA/CA)

## (undated) DEVICE — DRESSING TEGADERM 8 X 12 - (10/BX 8BX/CA)

## (undated) DEVICE — SUTURE 4-0 VICRYL PLUS FS-2 - 27 INCH (36/BX)

## (undated) DEVICE — SUTURE 1 CHROMIC GUTCT-1 27 (36PK/BX)"

## (undated) DEVICE — TRAY BLADDER CARE W/ 16 FR FOLEY CATHETER STATLOCK  (10/CA)

## (undated) DEVICE — CHLORAPREP 26 ML APPLICATOR - ORANGE TINT(25/CA)

## (undated) DEVICE — BLANKET UNDERBODY FULL ACCES - (5/CA)

## (undated) DEVICE — HEAD HOLDER JUNIOR/ADULT

## (undated) DEVICE — ELECTRODE DUAL RETURN W/ CORD - (50/PK)

## (undated) DEVICE — SUTUREABS02-0 CT1 27IN - (36EA/BX)

## (undated) DEVICE — BANDAGE TENSOPLAST 3 X 5 YDS - (1/EA)

## (undated) DEVICE — ADHESIVE DERMABOND HVD MINI (12EA/BX)

## (undated) DEVICE — TUBING CLEARLINK DUO-VENT - C-FLO (48EA/CA)

## (undated) DEVICE — DETERGENT RENUZYME PLUS 10 OZ PACKET (50/BX)

## (undated) DEVICE — DRESSING NON-ADHERING 8 X 3 - (50/BX)

## (undated) DEVICE — CANISTER SUCTION 3000ML MECHANICAL FILTER AUTO SHUTOFF MEDI-VAC NONSTERILE LF DISP  (40EA/CA)

## (undated) DEVICE — CATHETER IV NON-SAFETY 18 GA X 1 1/4 (50/BX 4BX/CA)

## (undated) DEVICE — SUTURE 0 VICRYL PLUS CT-1 - 8 X 18 INCH (12/BX)

## (undated) DEVICE — PACK C-SECTION (2EA/CA)

## (undated) DEVICE — SUTURE 0 VICRYL PLUS CT-1 - 36 INCH (36/BX)

## (undated) DEVICE — STAPLER SKIN DISP - (6/BX 10BX/CA) VISISTAT

## (undated) DEVICE — SODIUM CHL IRRIGATION 0.9% 1000ML (12EA/CA)

## (undated) DEVICE — GLOVE BIOGEL SZ 6 PF LATEX - (50EA/BX 4BX/CA)

## (undated) DEVICE — DRESSING, 4X4 VIGILON STERILE

## (undated) DEVICE — DRESSING ABDOMINAL PAD STERILE 8 X 10" (360EA/CA)"

## (undated) DEVICE — SUTURE 0 GUT-PLAIN (36PK/BX)

## (undated) DEVICE — TAPE CLOTH MEDIPORE 6 INCH - (12RL/CA)